# Patient Record
Sex: MALE | Race: OTHER | Employment: OTHER | URBAN - METROPOLITAN AREA
[De-identification: names, ages, dates, MRNs, and addresses within clinical notes are randomized per-mention and may not be internally consistent; named-entity substitution may affect disease eponyms.]

---

## 2020-01-08 RX ORDER — AZITHROMYCIN 250 MG/1
250 TABLET, FILM COATED ORAL DAILY
Status: ON HOLD | COMMUNITY
End: 2020-01-09

## 2020-01-08 RX ORDER — SIMVASTATIN 40 MG/1
80 TABLET, FILM COATED ORAL
COMMUNITY

## 2020-01-08 RX ORDER — DOXAZOSIN 8 MG/1
8 TABLET ORAL DAILY
COMMUNITY

## 2020-01-08 RX ORDER — BENZONATATE 100 MG/1
100 CAPSULE ORAL
Status: ON HOLD | COMMUNITY
End: 2021-05-07

## 2020-01-08 RX ORDER — HYDROCHLOROTHIAZIDE 25 MG/1
25 TABLET ORAL DAILY
Status: ON HOLD | COMMUNITY
End: 2021-05-07

## 2020-01-08 RX ORDER — ASPIRIN 81 MG/1
81 TABLET ORAL DAILY
COMMUNITY

## 2020-01-08 RX ORDER — ATENOLOL 100 MG/1
100 TABLET ORAL DAILY
Status: ON HOLD | COMMUNITY
End: 2021-05-07

## 2020-01-09 ENCOUNTER — HOSPITAL ENCOUNTER (OUTPATIENT)
Age: 85
Setting detail: OUTPATIENT SURGERY
Discharge: HOME | End: 2020-01-09
Attending: INTERNAL MEDICINE | Admitting: INTERNAL MEDICINE
Payer: MEDICARE

## 2020-01-09 VITALS
HEIGHT: 62 IN | OXYGEN SATURATION: 100 % | HEART RATE: 64 BPM | SYSTOLIC BLOOD PRESSURE: 150 MMHG | WEIGHT: 146.1 LBS | BODY MASS INDEX: 26.89 KG/M2 | DIASTOLIC BLOOD PRESSURE: 77 MMHG | TEMPERATURE: 97.6 F | RESPIRATION RATE: 14 BRPM

## 2020-01-09 DIAGNOSIS — I35.0 AORTIC STENOSIS: ICD-10-CM

## 2020-01-09 LAB
ACT BLD: 189 SECS (ref 79–138)
ANION GAP SERPL CALC-SCNC: 6 MMOL/L (ref 3–18)
ATRIAL RATE: 84 BPM
BUN SERPL-MCNC: 11 MG/DL (ref 7–18)
BUN/CREAT SERPL: 9 (ref 12–20)
CALCIUM SERPL-MCNC: 8.9 MG/DL (ref 8.5–10.1)
CALCULATED R AXIS, ECG10: -31 DEGREES
CALCULATED T AXIS, ECG11: -12 DEGREES
CHLORIDE SERPL-SCNC: 106 MMOL/L (ref 100–111)
CHOLEST SERPL-MCNC: 128 MG/DL
CO2 SERPL-SCNC: 30 MMOL/L (ref 21–32)
CREAT SERPL-MCNC: 1.17 MG/DL (ref 0.6–1.3)
DIAGNOSIS, 93000: NORMAL
ERYTHROCYTE [DISTWIDTH] IN BLOOD BY AUTOMATED COUNT: 12.9 % (ref 11.6–14.5)
GLUCOSE SERPL-MCNC: 108 MG/DL (ref 74–99)
HCT VFR BLD AUTO: 37.7 % (ref 36–48)
HDLC SERPL-MCNC: 45 MG/DL (ref 40–60)
HDLC SERPL: 2.8 {RATIO} (ref 0–5)
HGB BLD-MCNC: 13 G/DL (ref 13–16)
INR PPP: 1.1 (ref 0.8–1.2)
LDLC SERPL CALC-MCNC: 59.6 MG/DL (ref 0–100)
LIPID PROFILE,FLP: NORMAL
MAGNESIUM SERPL-MCNC: 2.3 MG/DL (ref 1.6–2.6)
MCH RBC QN AUTO: 29.9 PG (ref 24–34)
MCHC RBC AUTO-ENTMCNC: 34.5 G/DL (ref 31–37)
MCV RBC AUTO: 86.7 FL (ref 74–97)
P-R INTERVAL, ECG05: 232 MS
PLATELET # BLD AUTO: 131 K/UL (ref 135–420)
PMV BLD AUTO: 10.9 FL (ref 9.2–11.8)
POTASSIUM SERPL-SCNC: 3.4 MMOL/L (ref 3.5–5.5)
PROTHROMBIN TIME: 13.8 SEC (ref 11.5–15.2)
Q-T INTERVAL, ECG07: 402 MS
QRS DURATION, ECG06: 120 MS
QTC CALCULATION (BEZET), ECG08: 414 MS
RBC # BLD AUTO: 4.35 M/UL (ref 4.7–5.5)
SODIUM SERPL-SCNC: 142 MMOL/L (ref 136–145)
TRIGL SERPL-MCNC: 117 MG/DL (ref ?–150)
VENTRICULAR RATE, ECG03: 64 BPM
VLDLC SERPL CALC-MCNC: 23.4 MG/DL
WBC # BLD AUTO: 6.8 K/UL (ref 4.6–13.2)

## 2020-01-09 PROCEDURE — 74011636320 HC RX REV CODE- 636/320: Performed by: INTERNAL MEDICINE

## 2020-01-09 PROCEDURE — 83735 ASSAY OF MAGNESIUM: CPT

## 2020-01-09 PROCEDURE — 74011250637 HC RX REV CODE- 250/637: Performed by: INTERNAL MEDICINE

## 2020-01-09 PROCEDURE — 93005 ELECTROCARDIOGRAM TRACING: CPT

## 2020-01-09 PROCEDURE — 85610 PROTHROMBIN TIME: CPT

## 2020-01-09 PROCEDURE — 93460 R&L HRT ART/VENTRICLE ANGIO: CPT | Performed by: INTERNAL MEDICINE

## 2020-01-09 PROCEDURE — C1769 GUIDE WIRE: HCPCS | Performed by: INTERNAL MEDICINE

## 2020-01-09 PROCEDURE — C1887 CATHETER, GUIDING: HCPCS | Performed by: INTERNAL MEDICINE

## 2020-01-09 PROCEDURE — 99152 MOD SED SAME PHYS/QHP 5/>YRS: CPT | Performed by: INTERNAL MEDICINE

## 2020-01-09 PROCEDURE — 99153 MOD SED SAME PHYS/QHP EA: CPT | Performed by: INTERNAL MEDICINE

## 2020-01-09 PROCEDURE — 85347 COAGULATION TIME ACTIVATED: CPT

## 2020-01-09 PROCEDURE — 77030012468 HC VLV BLEEDBK CNTRL ABBT -B: Performed by: INTERNAL MEDICINE

## 2020-01-09 PROCEDURE — 77030013797 HC KT TRNSDUC PRSSR EDWD -A: Performed by: INTERNAL MEDICINE

## 2020-01-09 PROCEDURE — 77030004522 HC CATH ANGI DX EXPO BSC -A: Performed by: INTERNAL MEDICINE

## 2020-01-09 PROCEDURE — 77030004521 HC CATH ANGI DX COOK -B: Performed by: INTERNAL MEDICINE

## 2020-01-09 PROCEDURE — C1894 INTRO/SHEATH, NON-LASER: HCPCS | Performed by: INTERNAL MEDICINE

## 2020-01-09 PROCEDURE — C1751 CATH, INF, PER/CENT/MIDLINE: HCPCS | Performed by: INTERNAL MEDICINE

## 2020-01-09 PROCEDURE — 74011000250 HC RX REV CODE- 250: Performed by: INTERNAL MEDICINE

## 2020-01-09 PROCEDURE — 77030008543 HC TBNG MON PRSS MRTM -A: Performed by: INTERNAL MEDICINE

## 2020-01-09 PROCEDURE — 74011250636 HC RX REV CODE- 250/636: Performed by: INTERNAL MEDICINE

## 2020-01-09 PROCEDURE — 80061 LIPID PANEL: CPT

## 2020-01-09 PROCEDURE — 77030016699 HC CATH ANGI DX INFN1 CARD -A: Performed by: INTERNAL MEDICINE

## 2020-01-09 PROCEDURE — 77030029997 HC DEV COM RDL R BND TELE -B: Performed by: INTERNAL MEDICINE

## 2020-01-09 PROCEDURE — 80048 BASIC METABOLIC PNL TOTAL CA: CPT

## 2020-01-09 PROCEDURE — 85027 COMPLETE CBC AUTOMATED: CPT

## 2020-01-09 RX ORDER — SODIUM CHLORIDE 0.9 % (FLUSH) 0.9 %
5-40 SYRINGE (ML) INJECTION AS NEEDED
Status: DISCONTINUED | OUTPATIENT
Start: 2020-01-09 | End: 2020-01-09 | Stop reason: HOSPADM

## 2020-01-09 RX ORDER — GUAIFENESIN 100 MG/5ML
81 LIQUID (ML) ORAL ONCE
Status: COMPLETED | OUTPATIENT
Start: 2020-01-09 | End: 2020-01-09

## 2020-01-09 RX ORDER — VERAPAMIL HYDROCHLORIDE 2.5 MG/ML
INJECTION, SOLUTION INTRAVENOUS AS NEEDED
Status: DISCONTINUED | OUTPATIENT
Start: 2020-01-09 | End: 2020-01-09 | Stop reason: HOSPADM

## 2020-01-09 RX ORDER — SODIUM CHLORIDE 9 MG/ML
25 INJECTION, SOLUTION INTRAVENOUS CONTINUOUS
Status: DISCONTINUED | OUTPATIENT
Start: 2020-01-09 | End: 2020-01-09 | Stop reason: HOSPADM

## 2020-01-09 RX ORDER — LIDOCAINE HYDROCHLORIDE 10 MG/ML
INJECTION INFILTRATION; PERINEURAL AS NEEDED
Status: DISCONTINUED | OUTPATIENT
Start: 2020-01-09 | End: 2020-01-09 | Stop reason: HOSPADM

## 2020-01-09 RX ORDER — HYDRALAZINE HYDROCHLORIDE 20 MG/ML
INJECTION INTRAMUSCULAR; INTRAVENOUS AS NEEDED
Status: DISCONTINUED | OUTPATIENT
Start: 2020-01-09 | End: 2020-01-09 | Stop reason: HOSPADM

## 2020-01-09 RX ORDER — SODIUM CHLORIDE 0.9 % (FLUSH) 0.9 %
5-40 SYRINGE (ML) INJECTION EVERY 8 HOURS
Status: DISCONTINUED | OUTPATIENT
Start: 2020-01-09 | End: 2020-01-09 | Stop reason: HOSPADM

## 2020-01-09 RX ORDER — HEPARIN SODIUM 200 [USP'U]/100ML
INJECTION, SOLUTION INTRAVENOUS
Status: COMPLETED | OUTPATIENT
Start: 2020-01-09 | End: 2020-01-09

## 2020-01-09 RX ORDER — HEPARIN SODIUM 1000 [USP'U]/ML
INJECTION, SOLUTION INTRAVENOUS; SUBCUTANEOUS AS NEEDED
Status: DISCONTINUED | OUTPATIENT
Start: 2020-01-09 | End: 2020-01-09 | Stop reason: HOSPADM

## 2020-01-09 RX ORDER — MIDAZOLAM HYDROCHLORIDE 1 MG/ML
INJECTION, SOLUTION INTRAMUSCULAR; INTRAVENOUS AS NEEDED
Status: DISCONTINUED | OUTPATIENT
Start: 2020-01-09 | End: 2020-01-09 | Stop reason: HOSPADM

## 2020-01-09 RX ORDER — FENTANYL CITRATE 50 UG/ML
INJECTION, SOLUTION INTRAMUSCULAR; INTRAVENOUS AS NEEDED
Status: DISCONTINUED | OUTPATIENT
Start: 2020-01-09 | End: 2020-01-09 | Stop reason: HOSPADM

## 2020-01-09 RX ADMIN — SODIUM CHLORIDE 25 ML/HR: 900 INJECTION, SOLUTION INTRAVENOUS at 08:20

## 2020-01-09 RX ADMIN — ASPIRIN 81 MG: 81 TABLET, CHEWABLE ORAL at 08:00

## 2020-01-09 NOTE — PROGRESS NOTES
Cardiology Progress Note        Patient: Herber Justin        Sex: male          DOA: 1/9/2020  YOB: 1935      Age:  81 y.o.        LOS:  LOS: 0 days   Assessment/Plan   Date of Surgery Update:  Herber Justin was seen and examined. History and physical has been reviewed. The patient has been examined.  There have been no significant clinical changes since the completion of the originally dated History and Physical.    Signed By: Venkatesh Mullins MD     January 9, 2020 8:37 AM           Signed By: Venkatesh Mullins MD     January 9, 2020

## 2020-01-09 NOTE — Clinical Note
TRANSFER - IN REPORT:     Verbal report received from: rn.     Report consisted of patient's Situation, Background, Assessment and   Recommendations(SBAR). Opportunity for questions and clarification was provided. Assessment completed upon patient's arrival to unit and care assumed. Patient transported with a Registered Nurse and [address] [place] [address] / [facility] [place].

## 2020-01-09 NOTE — Clinical Note
TRANSFER - OUT REPORT:     Verbal report given to: rn.     Report consisted of patient's Situation, Background, Assessment and   Recommendations(SBAR). Opportunity for questions and clarification was provided. Patient transported with a Registered Nurse and [address] [place] [address] / [facility]. Patient transported to: care unit.

## 2020-01-09 NOTE — PROGRESS NOTES
Prepped & ready for procedure. Attempted x 2 for [facility] IV for RHC not successful by Dany Mancuso RN.   0272 back from cath. TR band intact to left radial no bleeding or swelling. Right femoral venous & arterial sheaths intact. Good n/v checks to right leg. Pt denies pain. Wife at bedside. [address] at bedside pulling both sheaths out and holding pressure. 1335 TR band released, sterile 2x2 & tegaderm applied with armboard. No bleeding or swelling. 1415 Scant amt of blood on right femoral dressing, area soft not hard, will continue to monitor. 1430 no further drainage on right femoral dressing. Voided 200 ml.  1545 Dr. Oskar Powers in, CD of cath report given wife. [phone] HOB elevated gradually, tolerated well. Right groin dressing intact, no bleeding or swelling. Discharge instructions reivewed with wife & pt. verbalized  understanding. 200 Ambulated with assist to bathroom, then ambulated around nurses station without any difficulty. IV removed. Right groin dressing intact & dry right wrist, dressing intact no bleeding or swelling at either site. 1815 Discharged home in care of wife in stable condition via w/c. Denies pain.

## 2020-01-09 NOTE — ROUTINE PROCESS
Cardiac Cath Lab:  Pre Procedure Chart Check     Patients chart was accessed and reviewed for possible and/or scheduled procedure. Creatinine Clearance:  Serum creatinine: 1.17 mg/dL 01/09/20 0805  Estimated creatinine clearance: 40.1 mL/min    Total Contrast  Load:  3 x estimated clearance amount=  120.3ml    75% of Contrast Load:  0.75 x Total Contrast Load=    90.2ml    Recent Labs     01/09/20  0805   WBC 6.8   RBC 4.35*   HCT 37.7   HGB 13.0   *   INR 1.1   PTP 13.8      K 3.4*   BUN 11   CREA 1.17   GFRAA >60   GFRNA 60*   CA 8.9       BMI: Body mass index is 26.72 kg/m². ALLERGIES:   Allergies   Allergen Reactions    Pcn [Penicillins] Rash       Lines:        Peripheral IV 01/09/20 Left Forearm (Active)   Site Assessment Clean, dry, & intact 1/9/2020  8:16 AM   Phlebitis Assessment 0 1/9/2020  8:16 AM   Infiltration Assessment 0 1/9/2020  8:16 AM   Dressing Status Clean, dry, & intact 1/9/2020  8:16 AM   Dressing Type Transparent;Tape 1/9/2020  8:16 AM   Hub Color/Line Status Pink 1/9/2020  8:16 AM   Action Taken Open ports on tubing capped 1/9/2020  8:16 AM   Alcohol Cap Used Yes 1/9/2020  8:16 AM          History:    Past Medical History:   Diagnosis Date    Arthritis     GERD (gastroesophageal reflux disease)     Hypertension      Past Surgical History:   Procedure Laterality Date    ABDOMEN SURGERY PROC UNLISTED      appendectomy    HX CHOLECYSTECTOMY      HX HEENT      blind left eye     HX UROLOGICAL      kiney stone    HX VASECTOMY       There is no problem list on file for this patient.

## 2020-01-09 NOTE — Clinical Note
Bilateral groin and right radial clipped, prepped with ChloraPrep and draped. Wet prep elapsed drying time: 5 mins.

## 2020-01-09 NOTE — DISCHARGE INSTRUCTIONS
DISCHARGE SUMMARY from Nurse    PATIENT INSTRUCTIONS:    After general anesthesia or intravenous sedation, for 24 hours or while taking prescription Narcotics:  · Limit your activities  · Do not drive and operate hazardous machinery  · Do not make important personal or business decisions  · Do  not drink alcoholic beverages  · If you have not urinated within 8 hours after discharge, please contact your surgeon on call. Report the following to your surgeon:  · Excessive pain, swelling, redness or odor of or around the surgical area  · Temperature over 100.5  · Nausea and vomiting lasting longer than 4 hours or if unable to take medications  · Any signs of decreased circulation or nerve impairment to extremity: change in color, persistent  numbness, tingling, coldness or increase pain  · Any questions    What to do at Home:    If you experience any of the following symptoms , please follow up with Dr. Myron Gordillo  *  Please give a list of your current medications to your Primary Care Provider. *  Please update this list whenever your medications are discontinued, doses are      changed, or new medications (including over-the-counter products) are added. *  Please carry medication information at all times in case of emergency situations. These are general instructions for a healthy lifestyle:    No smoking/ No tobacco products/ Avoid exposure to second hand smoke  Surgeon General's Warning:  Quitting smoking now greatly reduces serious risk to your health.     Obesity, smoking, and sedentary lifestyle greatly increases your risk for illness    A healthy diet, regular physical exercise & weight monitoring are important for maintaining a healthy lifestyle    You may be retaining fluid if you have a history of heart failure or if you experience any of the following symptoms:  Weight gain of 3 pounds or more overnight or 5 pounds in a week, increased swelling in our hands or feet or shortness of breath while lying flat in bed. Please call your doctor as soon as you notice any of these symptoms; do not wait until your next office visit. The discharge information has been reviewed with the patient and spouse. The patient and spouse verbalized understanding. Discharge medications reviewed with the patient and spouse and appropriate educational materials and side effects teaching were provided. ___________________________________________________________________________________________________________________________________                 Cardiac Catheterization/Angiography Discharge Instructions    *Check the puncture site frequently for swelling or bleeding. If you see any bleeding, lie down and apply pressure over the area with a clean towel or washcloth. Notify your doctor for any redness, swelling, drainage or oozing from the puncture site. Notify your doctor for any fever or chills. *If the leg or arm with the puncture becomes cold, numb or painful, call Dr Nitin Quezada    *Activity should be limited for the next 48 hours. Climb stairs as little as possible and avoid any stooping, bending or strenuous activity for 48 hours. No heavy lifting (anything over 10 pounds) for three days. *Do not drive for 48 hours. *You may resume your usual diet. Drink more fluids than usual.    *Have a responsible person drive you home and stay with you for at least 24 hours after your heart catheterization/angiography. *You may remove the bandage from your Right, Groin and Arm in 24 hours. You may shower in 24 hours. No tub baths, hot tubs or swimming for one week. Do not place any lotions, creams, powders, ointments over the puncture site for one week. You may place a clean band-aid over the puncture site each day for 5 days. Change this daily.       Patient armband removed and shredded

## 2020-01-10 LAB
CRD SYSTOLIC BP: 199
END DIASTOLIC PRESSURE: 21

## 2020-07-30 ENCOUNTER — HOSPITAL ENCOUNTER (EMERGENCY)
Dept: CT IMAGING | Age: 85
Discharge: HOME | End: 2020-07-30
Attending: EMERGENCY MEDICINE
Payer: MEDICARE

## 2020-07-30 ENCOUNTER — APPOINTMENT (OUTPATIENT)
Dept: GENERAL RADIOLOGY | Age: 85
End: 2020-07-30
Attending: EMERGENCY MEDICINE
Payer: MEDICARE

## 2020-07-30 ENCOUNTER — HOSPITAL ENCOUNTER (EMERGENCY)
Age: 85
Discharge: OTHER INSTITUTION | End: 2020-07-30
Attending: EMERGENCY MEDICINE
Payer: MEDICARE

## 2020-07-30 VITALS
DIASTOLIC BLOOD PRESSURE: 116 MMHG | TEMPERATURE: 98.4 F | SYSTOLIC BLOOD PRESSURE: 218 MMHG | HEIGHT: 62 IN | HEART RATE: 78 BPM | RESPIRATION RATE: 16 BRPM | WEIGHT: 143 LBS | BODY MASS INDEX: 26.31 KG/M2 | OXYGEN SATURATION: 100 %

## 2020-07-30 DIAGNOSIS — S06.5XAA SUBDURAL HEMATOMA: Primary | ICD-10-CM

## 2020-07-30 DIAGNOSIS — Z79.82 ON ASPIRIN AT HOME: ICD-10-CM

## 2020-07-30 DIAGNOSIS — S62.115A CLOSED NONDISPLACED FRACTURE OF TRIQUETRUM OF LEFT WRIST, INITIAL ENCOUNTER: ICD-10-CM
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
## 2020-07-30 LAB
ALBUMIN SERPL-MCNC: 4.6 G/DL (ref 3.4–5)
ALBUMIN/GLOB SERPL: 1.3 {RATIO} (ref 0.8–1.7)
ALP SERPL-CCNC: 50 U/L (ref 45–117)
ALT SERPL-CCNC: 81 U/L (ref 16–61)
ANION GAP SERPL CALC-SCNC: 4 MMOL/L (ref 3–18)
APTT PPP: 32.3 SEC (ref 23–36.4)
AST SERPL-CCNC: 50 U/L (ref 10–38)
BASOPHILS # BLD: 0 K/UL (ref 0–0.1)
BASOPHILS NFR BLD: 0 % (ref 0–2)
BILIRUB SERPL-MCNC: 0.6 MG/DL (ref 0.2–1)
BUN SERPL-MCNC: 20 MG/DL (ref 7–18)
BUN/CREAT SERPL: 16 (ref 12–20)
CALCIUM SERPL-MCNC: 9.1 MG/DL (ref 8.5–10.1)
CHLORIDE SERPL-SCNC: 105 MMOL/L (ref 100–111)
CO2 SERPL-SCNC: 31 MMOL/L (ref 21–32)
CREAT SERPL-MCNC: 1.23 MG/DL (ref 0.6–1.3)
DIFFERENTIAL METHOD BLD: ABNORMAL
EOSINOPHIL # BLD: 0.1 K/UL (ref 0–0.4)
EOSINOPHIL NFR BLD: 1 % (ref 0–5)
ERYTHROCYTE [DISTWIDTH] IN BLOOD BY AUTOMATED COUNT: 12.8 % (ref 11.6–14.5)
GLOBULIN SER CALC-MCNC: 3.5 G/DL (ref 2–4)
GLUCOSE SERPL-MCNC: 79 MG/DL (ref 74–99)
HCT VFR BLD AUTO: 45.7 % (ref 36–48)
HGB BLD-MCNC: 15.2 G/DL (ref 13–16)
INR PPP: 1 (ref 0.8–1.2)
LYMPHOCYTES # BLD: 1.9 K/UL (ref 0.9–3.6)
LYMPHOCYTES NFR BLD: 17 % (ref 21–52)
MCH RBC QN AUTO: 29.5 PG (ref 24–34)
MCHC RBC AUTO-ENTMCNC: 33.3 G/DL (ref 31–37)
MCV RBC AUTO: 88.7 FL (ref 74–97)
MONOCYTES # BLD: 0.8 K/UL (ref 0.05–1.2)
MONOCYTES NFR BLD: 7 % (ref 3–10)
NEUTS SEG # BLD: 8.3 K/UL (ref 1.8–8)
NEUTS SEG NFR BLD: 75 % (ref 40–73)
PLATELET # BLD AUTO: 126 K/UL (ref 135–420)
PMV BLD AUTO: 11.3 FL (ref 9.2–11.8)
POTASSIUM SERPL-SCNC: 4.5 MMOL/L (ref 3.5–5.5)
PROT SERPL-MCNC: 8.1 G/DL (ref 6.4–8.2)
PROTHROMBIN TIME: 13.4 SEC (ref 11.5–15.2)
RBC # BLD AUTO: 5.15 M/UL (ref 4.7–5.5)
SODIUM SERPL-SCNC: 140 MMOL/L (ref 136–145)
WBC # BLD AUTO: 11 K/UL (ref 4.6–13.2)

## 2020-07-30 PROCEDURE — 85025 COMPLETE CBC W/AUTO DIFF WBC: CPT

## 2020-07-30 PROCEDURE — 99284 EMERGENCY DEPT VISIT MOD MDM: CPT

## 2020-07-30 PROCEDURE — 75810000053 HC SPLINT APPLICATION

## 2020-07-30 PROCEDURE — 70450 CT HEAD/BRAIN W/O DYE: CPT

## 2020-07-30 PROCEDURE — 85730 THROMBOPLASTIN TIME PARTIAL: CPT

## 2020-07-30 PROCEDURE — 74011250636 HC RX REV CODE- 250/636: Performed by: EMERGENCY MEDICINE

## 2020-07-30 PROCEDURE — 80053 COMPREHEN METABOLIC PANEL: CPT

## 2020-07-30 PROCEDURE — 96368 THER/DIAG CONCURRENT INF: CPT

## 2020-07-30 PROCEDURE — 96365 THER/PROPH/DIAG IV INF INIT: CPT

## 2020-07-30 PROCEDURE — 73130 X-RAY EXAM OF HAND: CPT

## 2020-07-30 PROCEDURE — 85610 PROTHROMBIN TIME: CPT

## 2020-07-30 PROCEDURE — 72131 CT LUMBAR SPINE W/O DYE: CPT

## 2020-07-30 PROCEDURE — 74011000250 HC RX REV CODE- 250: Performed by: EMERGENCY MEDICINE

## 2020-07-30 RX ORDER — LEVETIRACETAM 10 MG/ML
1000 INJECTION INTRAVASCULAR
Status: COMPLETED | OUTPATIENT
Start: 2020-07-30 | End: 2020-07-30

## 2020-07-30 RX ORDER — NICARDIPINE HYDROCHLORIDE 0.1 MG/ML
5-15 INJECTION INTRAVENOUS
Status: DISCONTINUED | OUTPATIENT
Start: 2020-07-30 | End: 2020-07-30 | Stop reason: HOSPADM

## 2020-07-30 RX ORDER — LABETALOL HCL 20 MG/4 ML
20 SYRINGE (ML) INTRAVENOUS
Status: DISCONTINUED | OUTPATIENT
Start: 2020-07-30 | End: 2020-07-30

## 2020-07-30 RX ADMIN — LEVETIRACETAM 1000 MG: 10 INJECTION INTRAVENOUS at 14:37

## 2021-05-05 ENCOUNTER — HOSPITAL ENCOUNTER (INPATIENT)
Age: 86
LOS: 3 days | Discharge: HOME | DRG: 392 | End: 2021-05-08
Attending: STUDENT IN AN ORGANIZED HEALTH CARE EDUCATION/TRAINING PROGRAM | Admitting: FAMILY MEDICINE
Payer: MEDICARE

## 2021-05-05 ENCOUNTER — APPOINTMENT (OUTPATIENT)
Dept: GENERAL RADIOLOGY | Age: 86
DRG: 392 | End: 2021-05-05
Attending: STUDENT IN AN ORGANIZED HEALTH CARE EDUCATION/TRAINING PROGRAM
Payer: MEDICARE

## 2021-05-05 ENCOUNTER — APPOINTMENT (OUTPATIENT)
Dept: CT IMAGING | Age: 86
DRG: 392 | End: 2021-05-05
Attending: STUDENT IN AN ORGANIZED HEALTH CARE EDUCATION/TRAINING PROGRAM
Payer: MEDICARE

## 2021-05-05 DIAGNOSIS — R65.10 SIRS (SYSTEMIC INFLAMMATORY RESPONSE SYNDROME) (HCC): ICD-10-CM

## 2021-05-05 DIAGNOSIS — K52.9 COLITIS: Primary | ICD-10-CM

## 2021-05-05 DIAGNOSIS — R93.5 ABNORMAL CT OF THE ABDOMEN: ICD-10-CM

## 2021-05-05 LAB
ALBUMIN SERPL-MCNC: 3.7 G/DL (ref 3.4–5)
ALBUMIN/GLOB SERPL: 1.3 {RATIO} (ref 0.8–1.7)
ALP SERPL-CCNC: 40 U/L (ref 45–117)
ALT SERPL-CCNC: 32 U/L (ref 16–61)
ANION GAP SERPL CALC-SCNC: 8 MMOL/L (ref 3–18)
APPEARANCE UR: CLEAR
AST SERPL-CCNC: 18 U/L (ref 10–38)
ATRIAL RATE: 77 BPM
BACTERIA URNS QL MICRO: ABNORMAL /HPF
BASOPHILS # BLD: 0 K/UL (ref 0–0.1)
BASOPHILS NFR BLD: 0 % (ref 0–2)
BILIRUB SERPL-MCNC: 0.7 MG/DL (ref 0.2–1)
BILIRUB UR QL: ABNORMAL
BUN SERPL-MCNC: 23 MG/DL (ref 7–18)
BUN/CREAT SERPL: 17 (ref 12–20)
CALCIUM SERPL-MCNC: 8.6 MG/DL (ref 8.5–10.1)
CALCULATED P AXIS, ECG09: 23 DEGREES
CALCULATED R AXIS, ECG10: -40 DEGREES
CALCULATED T AXIS, ECG11: 100 DEGREES
CHLORIDE SERPL-SCNC: 108 MMOL/L (ref 100–111)
CK MB CFR SERPL CALC: 1.4 % (ref 0–4)
CK MB SERPL-MCNC: 1.9 NG/ML (ref 5–25)
CK SERPL-CCNC: 136 U/L (ref 39–308)
CO2 SERPL-SCNC: 24 MMOL/L (ref 21–32)
COLOR UR: ABNORMAL
COVID-19 RAPID TEST, COVR: NOT DETECTED
CREAT SERPL-MCNC: 1.39 MG/DL (ref 0.6–1.3)
DIAGNOSIS, 93000: NORMAL
DIFFERENTIAL METHOD BLD: ABNORMAL
EOSINOPHIL # BLD: 0 K/UL (ref 0–0.4)
EOSINOPHIL NFR BLD: 0 % (ref 0–5)
EPITH CASTS URNS QL MICRO: ABNORMAL /LPF (ref 0–5)
ERYTHROCYTE [DISTWIDTH] IN BLOOD BY AUTOMATED COUNT: 12.9 % (ref 11.6–14.5)
GLOBULIN SER CALC-MCNC: 2.8 G/DL (ref 2–4)
GLUCOSE SERPL-MCNC: 98 MG/DL (ref 74–99)
GLUCOSE UR STRIP.AUTO-MCNC: 100 MG/DL
HCT VFR BLD AUTO: 39.4 % (ref 36–48)
HGB BLD-MCNC: 13.5 G/DL (ref 13–16)
HGB UR QL STRIP: NEGATIVE
HYALINE CASTS URNS QL MICRO: ABNORMAL /LPF (ref 0–2)
KETONES UR QL STRIP.AUTO: ABNORMAL MG/DL
LACTATE BLD-SCNC: 0.74 MMOL/L (ref 0.4–2)
LEUKOCYTE ESTERASE UR QL STRIP.AUTO: NEGATIVE
LIPASE SERPL-CCNC: 72 U/L (ref 73–393)
LYMPHOCYTES # BLD: 2.5 K/UL (ref 0.9–3.6)
LYMPHOCYTES NFR BLD: 15 % (ref 21–52)
MCH RBC QN AUTO: 30.9 PG (ref 24–34)
MCHC RBC AUTO-ENTMCNC: 34.3 G/DL (ref 31–37)
MCV RBC AUTO: 90.2 FL (ref 74–97)
MONOCYTES # BLD: 1.5 K/UL (ref 0.05–1.2)
MONOCYTES NFR BLD: 9 % (ref 3–10)
MUCOUS THREADS URNS QL MICRO: ABNORMAL /LPF
NEUTS SEG # BLD: 12.8 K/UL (ref 1.8–8)
NEUTS SEG NFR BLD: 76 % (ref 40–73)
NITRITE UR QL STRIP.AUTO: NEGATIVE
P-R INTERVAL, ECG05: 216 MS
PH UR STRIP: 5.5 [PH] (ref 5–8)
PLATELET # BLD AUTO: 147 K/UL (ref 135–420)
PMV BLD AUTO: 11.4 FL (ref 9.2–11.8)
POTASSIUM SERPL-SCNC: 3.9 MMOL/L (ref 3.5–5.5)
PROT SERPL-MCNC: 6.5 G/DL (ref 6.4–8.2)
PROT UR STRIP-MCNC: 30 MG/DL
Q-T INTERVAL, ECG07: 362 MS
QRS DURATION, ECG06: 114 MS
QTC CALCULATION (BEZET), ECG08: 409 MS
RBC # BLD AUTO: 4.37 M/UL (ref 4.35–5.65)
RBC #/AREA URNS HPF: ABNORMAL /HPF (ref 0–5)
SARS-COV-2, COV2: NORMAL
SODIUM SERPL-SCNC: 140 MMOL/L (ref 136–145)
SOURCE, COVRS: NORMAL
SP GR UR REFRACTOMETRY: >1.03 (ref 1–1.03)
TROPONIN I SERPL-MCNC: <0.02 NG/ML (ref 0–0.04)
UROBILINOGEN UR QL STRIP.AUTO: 1 EU/DL (ref 0.2–1)
VENTRICULAR RATE, ECG03: 77 BPM
WBC # BLD AUTO: 16.9 K/UL (ref 4.6–13.2)
WBC URNS QL MICRO: ABNORMAL /HPF (ref 0–5)

## 2021-05-05 PROCEDURE — U0003 INFECTIOUS AGENT DETECTION BY NUCLEIC ACID (DNA OR RNA); SEVERE ACUTE RESPIRATORY SYNDROME CORONAVIRUS 2 (SARS-COV-2) (CORONAVIRUS DISEASE [COVID-19]), AMPLIFIED PROBE TECHNIQUE, MAKING USE OF HIGH THROUGHPUT TECHNOLOGIES AS DESCRIBED BY CMS-2020-01-R: HCPCS

## 2021-05-05 PROCEDURE — 80053 COMPREHEN METABOLIC PANEL: CPT

## 2021-05-05 PROCEDURE — 65270000029 HC RM PRIVATE

## 2021-05-05 PROCEDURE — 93005 ELECTROCARDIOGRAM TRACING: CPT

## 2021-05-05 PROCEDURE — 83605 ASSAY OF LACTIC ACID: CPT

## 2021-05-05 PROCEDURE — 74011250637 HC RX REV CODE- 250/637: Performed by: PHYSICIAN ASSISTANT

## 2021-05-05 PROCEDURE — 96374 THER/PROPH/DIAG INJ IV PUSH: CPT

## 2021-05-05 PROCEDURE — 71045 X-RAY EXAM CHEST 1 VIEW: CPT

## 2021-05-05 PROCEDURE — 82553 CREATINE MB FRACTION: CPT

## 2021-05-05 PROCEDURE — 81001 URINALYSIS AUTO W/SCOPE: CPT

## 2021-05-05 PROCEDURE — 99285 EMERGENCY DEPT VISIT HI MDM: CPT

## 2021-05-05 PROCEDURE — 87635 SARS-COV-2 COVID-19 AMP PRB: CPT

## 2021-05-05 PROCEDURE — 74011250636 HC RX REV CODE- 250/636: Performed by: PHYSICIAN ASSISTANT

## 2021-05-05 PROCEDURE — 85025 COMPLETE CBC W/AUTO DIFF WBC: CPT

## 2021-05-05 PROCEDURE — 74177 CT ABD & PELVIS W/CONTRAST: CPT

## 2021-05-05 PROCEDURE — 87040 BLOOD CULTURE FOR BACTERIA: CPT

## 2021-05-05 PROCEDURE — 83690 ASSAY OF LIPASE: CPT

## 2021-05-05 PROCEDURE — 74011000636 HC RX REV CODE- 636: Performed by: STUDENT IN AN ORGANIZED HEALTH CARE EDUCATION/TRAINING PROGRAM

## 2021-05-05 RX ORDER — ACETAMINOPHEN 325 MG/1
650 TABLET ORAL
Status: DISCONTINUED | OUTPATIENT
Start: 2021-05-05 | End: 2021-05-05

## 2021-05-05 RX ORDER — CLOPIDOGREL BISULFATE 75 MG/1
75 TABLET ORAL DAILY
COMMUNITY

## 2021-05-05 RX ORDER — ENOXAPARIN SODIUM 100 MG/ML
40 INJECTION SUBCUTANEOUS DAILY
Status: DISCONTINUED | OUTPATIENT
Start: 2021-05-06 | End: 2021-05-08 | Stop reason: HOSPADM

## 2021-05-05 RX ORDER — METRONIDAZOLE 500 MG/100ML
500 INJECTION, SOLUTION INTRAVENOUS EVERY 8 HOURS
Status: DISCONTINUED | OUTPATIENT
Start: 2021-05-05 | End: 2021-05-08 | Stop reason: HOSPADM

## 2021-05-05 RX ORDER — POLYETHYLENE GLYCOL 3350 17 G/17G
17 POWDER, FOR SOLUTION ORAL DAILY PRN
Status: DISCONTINUED | OUTPATIENT
Start: 2021-05-05 | End: 2021-05-08 | Stop reason: HOSPADM

## 2021-05-05 RX ORDER — ONDANSETRON 2 MG/ML
4 INJECTION INTRAMUSCULAR; INTRAVENOUS
Status: DISCONTINUED | OUTPATIENT
Start: 2021-05-05 | End: 2021-05-08 | Stop reason: HOSPADM

## 2021-05-05 RX ORDER — ACETAMINOPHEN 500 MG
1000 TABLET ORAL
Status: COMPLETED | OUTPATIENT
Start: 2021-05-05 | End: 2021-05-05

## 2021-05-05 RX ORDER — PROMETHAZINE HYDROCHLORIDE 25 MG/1
12.5 TABLET ORAL
Status: DISCONTINUED | OUTPATIENT
Start: 2021-05-05 | End: 2021-05-08 | Stop reason: HOSPADM

## 2021-05-05 RX ORDER — ACETAMINOPHEN 325 MG/1
650 TABLET ORAL
Status: DISCONTINUED | OUTPATIENT
Start: 2021-05-05 | End: 2021-05-08 | Stop reason: HOSPADM

## 2021-05-05 RX ORDER — LEVOFLOXACIN 5 MG/ML
750 INJECTION, SOLUTION INTRAVENOUS EVERY 24 HOURS
Status: DISCONTINUED | OUTPATIENT
Start: 2021-05-05 | End: 2021-05-08

## 2021-05-05 RX ORDER — OXYCODONE HYDROCHLORIDE 5 MG/1
5 TABLET ORAL
Status: DISCONTINUED | OUTPATIENT
Start: 2021-05-05 | End: 2021-05-08 | Stop reason: HOSPADM

## 2021-05-05 RX ORDER — POTASSIUM CHLORIDE 750 MG/1
TABLET, FILM COATED, EXTENDED RELEASE ORAL
COMMUNITY

## 2021-05-05 RX ORDER — SODIUM CHLORIDE 0.9 % (FLUSH) 0.9 %
5-10 SYRINGE (ML) INJECTION AS NEEDED
Status: DISCONTINUED | OUTPATIENT
Start: 2021-05-05 | End: 2021-05-08 | Stop reason: SDUPTHER

## 2021-05-05 RX ORDER — SODIUM CHLORIDE 0.9 % (FLUSH) 0.9 %
5-40 SYRINGE (ML) INJECTION AS NEEDED
Status: DISCONTINUED | OUTPATIENT
Start: 2021-05-05 | End: 2021-05-08 | Stop reason: HOSPADM

## 2021-05-05 RX ORDER — LANOLIN ALCOHOL/MO/W.PET/CERES
400 CREAM (GRAM) TOPICAL DAILY
COMMUNITY

## 2021-05-05 RX ORDER — ACETAMINOPHEN 650 MG/1
650 SUPPOSITORY RECTAL
Status: DISCONTINUED | OUTPATIENT
Start: 2021-05-05 | End: 2021-05-08 | Stop reason: HOSPADM

## 2021-05-05 RX ORDER — LISINOPRIL 10 MG/1
10 TABLET ORAL DAILY
Status: ON HOLD | COMMUNITY
End: 2021-05-07

## 2021-05-05 RX ORDER — SODIUM CHLORIDE 0.9 % (FLUSH) 0.9 %
5-40 SYRINGE (ML) INJECTION EVERY 8 HOURS
Status: DISCONTINUED | OUTPATIENT
Start: 2021-05-05 | End: 2021-05-08 | Stop reason: HOSPADM

## 2021-05-05 RX ORDER — MORPHINE SULFATE 2 MG/ML
1 INJECTION, SOLUTION INTRAMUSCULAR; INTRAVENOUS
Status: DISCONTINUED | OUTPATIENT
Start: 2021-05-05 | End: 2021-05-08 | Stop reason: HOSPADM

## 2021-05-05 RX ORDER — AMLODIPINE BESYLATE 5 MG/1
5 TABLET ORAL DAILY
COMMUNITY

## 2021-05-05 RX ORDER — METOPROLOL SUCCINATE 50 MG/1
50 TABLET, EXTENDED RELEASE ORAL 2 TIMES DAILY
COMMUNITY

## 2021-05-05 RX ADMIN — Medication 10 ML: at 23:56

## 2021-05-05 RX ADMIN — LEVOFLOXACIN 750 MG: 5 INJECTION, SOLUTION INTRAVENOUS at 21:00

## 2021-05-05 RX ADMIN — ACETAMINOPHEN 1000 MG: 500 TABLET ORAL at 17:09

## 2021-05-05 RX ADMIN — METRONIDAZOLE 500 MG: 500 INJECTION, SOLUTION INTRAVENOUS at 19:50

## 2021-05-05 RX ADMIN — IOPAMIDOL 100 ML: 612 INJECTION, SOLUTION INTRAVENOUS at 17:51

## 2021-05-05 NOTE — ED TRIAGE NOTES
Pt to ED for RUQ abdominal pain, fever, and generalized fatigue/weakness since Sunday. Pt denies N/V, but reports diarrhea since Tuesday night. Pt denies CP/SOB, but reports non-productive cough.

## 2021-05-05 NOTE — Clinical Note
Status[de-identified] INPATIENT [101]   Type of Bed: Medical [8]   Cardiac Monitoring Required?: No   Inpatient Hospitalization Certified Necessary for the Following Reasons: 3.  Patient receiving treatment that can only be provided in an inpatient setting (further clarification in H&P documentation)   Admitting Diagnosis: Colitis [326529]   Admitting Diagnosis: SIRS (systemic inflammatory response syndrome) [facility]) [7702069]   Admitting Physician: Angelito Phan [[id number]]   Attending Physician: Angelito Phan [[id number]]   Estimated Length of Stay: 2 Midnights   Discharge Plan[de-identified] Home with Office Follow-up

## 2021-05-05 NOTE — ED NOTES
Report received from [facility]. Assumed care of patient at this time.

## 2021-05-06 ENCOUNTER — APPOINTMENT (OUTPATIENT)
Dept: VASCULAR SURGERY | Age: 86
DRG: 392 | End: 2021-05-06
Attending: FAMILY MEDICINE
Payer: MEDICARE

## 2021-05-06 ENCOUNTER — APPOINTMENT (OUTPATIENT)
Dept: GENERAL RADIOLOGY | Age: 86
DRG: 392 | End: 2021-05-06
Attending: FAMILY MEDICINE
Payer: MEDICARE

## 2021-05-06 PROBLEM — I10 HTN (HYPERTENSION): Status: ACTIVE | Noted: 2021-05-06

## 2021-05-06 PROBLEM — N18.30 CKD (CHRONIC KIDNEY DISEASE) STAGE 3, GFR 30-59 ML/MIN (HCC): Status: ACTIVE | Noted: 2021-05-06

## 2021-05-06 PROBLEM — R05.9 COUGH: Status: ACTIVE | Noted: 2021-05-06

## 2021-05-06 LAB
ALBUMIN SERPL-MCNC: 3.2 G/DL (ref 3.4–5)
ALBUMIN/GLOB SERPL: 1.1 {RATIO} (ref 0.8–1.7)
ALP SERPL-CCNC: 40 U/L (ref 45–117)
ALT SERPL-CCNC: 27 U/L (ref 16–61)
ANION GAP SERPL CALC-SCNC: 6 MMOL/L (ref 3–18)
AST SERPL-CCNC: 16 U/L (ref 10–38)
BILIRUB SERPL-MCNC: 0.7 MG/DL (ref 0.2–1)
BUN SERPL-MCNC: 19 MG/DL (ref 7–18)
BUN/CREAT SERPL: 14 (ref 12–20)
CALCIUM SERPL-MCNC: 8.5 MG/DL (ref 8.5–10.1)
CHLORIDE SERPL-SCNC: 107 MMOL/L (ref 100–111)
CO2 SERPL-SCNC: 29 MMOL/L (ref 21–32)
CREAT SERPL-MCNC: 1.34 MG/DL (ref 0.6–1.3)
ERYTHROCYTE [DISTWIDTH] IN BLOOD BY AUTOMATED COUNT: 13.1 % (ref 11.6–14.5)
GLOBULIN SER CALC-MCNC: 2.8 G/DL (ref 2–4)
GLUCOSE SERPL-MCNC: 93 MG/DL (ref 74–99)
HCT VFR BLD AUTO: 38.2 % (ref 36–48)
HGB BLD-MCNC: 13 G/DL (ref 13–16)
MCH RBC QN AUTO: 31 PG (ref 24–34)
MCHC RBC AUTO-ENTMCNC: 34 G/DL (ref 31–37)
MCV RBC AUTO: 91.2 FL (ref 74–97)
PLATELET # BLD AUTO: 102 K/UL (ref 135–420)
PMV BLD AUTO: 11.6 FL (ref 9.2–11.8)
POTASSIUM SERPL-SCNC: 3.9 MMOL/L (ref 3.5–5.5)
PROT SERPL-MCNC: 6 G/DL (ref 6.4–8.2)
RBC # BLD AUTO: 4.19 M/UL (ref 4.35–5.65)
SARS-COV-2, COV2NT: NOT DETECTED
SODIUM SERPL-SCNC: 142 MMOL/L (ref 136–145)
WBC # BLD AUTO: 12.6 K/UL (ref 4.6–13.2)

## 2021-05-06 PROCEDURE — 65270000029 HC RM PRIVATE

## 2021-05-06 PROCEDURE — 92610 EVALUATE SWALLOWING FUNCTION: CPT

## 2021-05-06 PROCEDURE — 74011250636 HC RX REV CODE- 250/636: Performed by: PHYSICIAN ASSISTANT

## 2021-05-06 PROCEDURE — 74011250637 HC RX REV CODE- 250/637: Performed by: FAMILY MEDICINE

## 2021-05-06 PROCEDURE — 93971 EXTREMITY STUDY: CPT

## 2021-05-06 PROCEDURE — 74230 X-RAY XM SWLNG FUNCJ C+: CPT

## 2021-05-06 PROCEDURE — 92526 ORAL FUNCTION THERAPY: CPT

## 2021-05-06 PROCEDURE — 36415 COLL VENOUS BLD VENIPUNCTURE: CPT

## 2021-05-06 PROCEDURE — 85027 COMPLETE CBC AUTOMATED: CPT

## 2021-05-06 PROCEDURE — 80053 COMPREHEN METABOLIC PANEL: CPT

## 2021-05-06 PROCEDURE — 74011000250 HC RX REV CODE- 250: Performed by: FAMILY MEDICINE

## 2021-05-06 PROCEDURE — 92611 MOTION FLUOROSCOPY/SWALLOW: CPT

## 2021-05-06 PROCEDURE — 74011250636 HC RX REV CODE- 250/636: Performed by: FAMILY MEDICINE

## 2021-05-06 RX ORDER — ASPIRIN 81 MG/1
81 TABLET ORAL DAILY
Status: DISCONTINUED | OUTPATIENT
Start: 2021-05-06 | End: 2021-05-08 | Stop reason: HOSPADM

## 2021-05-06 RX ORDER — CLOPIDOGREL BISULFATE 75 MG/1
75 TABLET ORAL DAILY
Status: DISCONTINUED | OUTPATIENT
Start: 2021-05-06 | End: 2021-05-08 | Stop reason: HOSPADM

## 2021-05-06 RX ORDER — ATORVASTATIN CALCIUM 20 MG/1
20 TABLET, FILM COATED ORAL
Status: DISCONTINUED | OUTPATIENT
Start: 2021-05-06 | End: 2021-05-08 | Stop reason: HOSPADM

## 2021-05-06 RX ORDER — SODIUM CHLORIDE 9 MG/ML
75 INJECTION, SOLUTION INTRAVENOUS CONTINUOUS
Status: DISCONTINUED | OUTPATIENT
Start: 2021-05-06 | End: 2021-05-08 | Stop reason: HOSPADM

## 2021-05-06 RX ORDER — DOXAZOSIN 4 MG/1
8 TABLET ORAL DAILY
Status: DISCONTINUED | OUTPATIENT
Start: 2021-05-06 | End: 2021-05-08 | Stop reason: HOSPADM

## 2021-05-06 RX ADMIN — LEVOFLOXACIN 750 MG: 5 INJECTION, SOLUTION INTRAVENOUS at 20:06

## 2021-05-06 RX ADMIN — Medication 10 ML: at 21:39

## 2021-05-06 RX ADMIN — ENOXAPARIN SODIUM 40 MG: 40 INJECTION SUBCUTANEOUS at 12:25

## 2021-05-06 RX ADMIN — Medication 1 TABLET: at 10:00

## 2021-05-06 RX ADMIN — ATORVASTATIN CALCIUM 20 MG: 20 TABLET, FILM COATED ORAL at 21:39

## 2021-05-06 RX ADMIN — SODIUM CHLORIDE 75 ML/HR: 900 INJECTION, SOLUTION INTRAVENOUS at 03:37

## 2021-05-06 RX ADMIN — FAMOTIDINE 20 MG: 10 INJECTION INTRAVENOUS at 21:39

## 2021-05-06 RX ADMIN — BARIUM SULFATE 15 ML: 400 PASTE ORAL at 14:07

## 2021-05-06 RX ADMIN — BARIUM SULFATE 25 G: 960 POWDER, FOR SUSPENSION ORAL at 14:07

## 2021-05-06 RX ADMIN — Medication 81 MG: at 10:00

## 2021-05-06 RX ADMIN — METRONIDAZOLE 500 MG: 500 INJECTION, SOLUTION INTRAVENOUS at 20:09

## 2021-05-06 RX ADMIN — CLOPIDOGREL BISULFATE 75 MG: 75 TABLET ORAL at 10:00

## 2021-05-06 RX ADMIN — METRONIDAZOLE 500 MG: 500 INJECTION, SOLUTION INTRAVENOUS at 11:25

## 2021-05-06 RX ADMIN — METRONIDAZOLE 500 MG: 500 INJECTION, SOLUTION INTRAVENOUS at 03:37

## 2021-05-06 RX ADMIN — BARIUM SULFATE 700 MG: 700 TABLET ORAL at 14:08

## 2021-05-06 RX ADMIN — DOXAZOSIN 8 MG: 4 TABLET ORAL at 10:00

## 2021-05-06 RX ADMIN — FAMOTIDINE 20 MG: 10 INJECTION INTRAVENOUS at 12:25

## 2021-05-06 NOTE — PROGRESS NOTES
Hospitalist Progress Note    Patient: Дмитрий Sullivan MRN: [id number]  CSN: [id number]    YOB: 1935  Age: 81 y.o. Sex: male    DOA: 5/5/2021 LOS:  LOS: 1 day          Chief Complaint:    Abdominal pain    Assessment/Plan     56-year-old male with hypertension, prior stroke, and CKD stage III admitted with colitis and SIRS. Colitis with SIRS  Cough, possibly due to aspiration  Hypertension  CKD stage III    Appreciate surgery evaluation, recommend medical management and possibly GI consult if symptoms worsening, have signed off  Fever has resolved  More advance diet as tolerated  Continue fluid hydration  Follow cultures  Continue Levaquin and Flagyl    Having swallowing evaluation today -had 0 aspiration events across all studies trials, released to regular diet    Blood pressure continues to be controlled -we will add back at her hypertensives as needed    Follow BMP  Creatinine remains at baseline  Avoid nephrotoxins    Disposition :  Patient Active Problem List   Diagnosis Code    Colitis K52.9    SIRS (systemic inflammatory response syndrome) ([facility]) R65.10    HTN (hypertension) I10    CKD (chronic kidney disease) stage 3, GFR 30-59 ml/min ([facility]) N18.30    Cough R05       Subjective:    Patient feeling much better this morning. Abdominal pain much reduced. As she is hungry and wants more food. Reports episode of diarrhea this morning. This is first episode of diarrhea days has since has been inpatient. Did have diarrhea at home. Review of systems:    Constitutional: denies fevers, chills, myalgias  Respiratory: denies SOB, cough  Cardiovascular: denies chest pain, palpitations  Gastrointestinal: denies nausea, vomiting, diarrhea      Vital signs/Intake and Output:  Visit Vitals  /60   Pulse 75   Temp 98 °F (36.7 °C)   Resp 16   Ht 5' 2\" (1.575 m)   Wt 68 kg (150 lb)   SpO2 100%   BMI 27.44 kg/m²     Current Shift:  No intake/output data recorded.   Last three shifts: 05/04 1901 - 05/06 0700  In: 100 [I.V.:100]  Out: -     Exam:    General: Well developed, alert, NAD, OX3  Head/Neck: NCAT, supple, No masses, No lymphadenopathy  CVS:Regular rate and rhythm, no M/R/G, S1/S2 heard, no thrill  Lungs:Clear to auscultation bilaterally, no wheezes, rhonchi, or rales  Abdomen: Soft, Nontender, No distention, Normal Bowel sounds, No hepatomegaly  Extremities: No C/C/E, pulses palpable 2+  Skin:normal texture and turgor, no rashes, no lesions  Neuro:grossly normal , follows commands  Psych:appropriate                Labs: Results:       Chemistry Recent Labs     05/06/21  0330 05/05/21  1656   GLU 93 98    140   K 3.9 3.9    108   CO2 29 24   BUN 19* 23*   CREA 1.34* 1.39*   CA 8.5 8.6   AGAP 6 8   BUCR 14 17   AP 40* 40*   TP 6.0* 6.5   ALB 3.2* 3.7   GLOB 2.8 2.8   AGRAT 1.1 1.3      CBC w/Diff Recent Labs     05/06/21  0330 05/05/21  1656   WBC 12.6 16.9*   RBC 4.19* 4.37   HGB 13.0 13.5   HCT 38.2 39.4   * 147   GRANS  --  76*   LYMPH  --  15*   EOS  --  0      Cardiac Enzymes Recent Labs     05/05/21  1656      CKND1 1.4      Coagulation No results for input(s): PTP, INR, APTT, INREXT in the last 72 hours. Lipid Panel Lab Results   Component Value Date/Time    Cholesterol, total 128 01/09/2020 08:05 AM    HDL Cholesterol 45 01/09/2020 08:05 AM    LDL, calculated 59.6 01/09/2020 08:05 AM    VLDL, calculated 23.4 01/09/2020 08:05 AM    Triglyceride 117 01/09/2020 08:05 AM    CHOL/HDL Ratio 2.8 01/09/2020 08:05 AM      BNP No results for input(s): BNPP in the last 72 hours.    Liver Enzymes Recent Labs     05/06/21  0330   TP 6.0*   ALB 3.2*   AP 40*      Thyroid Studies No results found for: T4, T3U, TSH, TSHEXT     Procedures/imaging: see electronic medical records for all procedures/Xrays and details which were not copied into this note but were reviewed prior to creation of Haylie Regalado MD

## 2021-05-06 NOTE — PROGRESS NOTES
Problem: Dysphagia (Adult)  Goal: *Acute Goals and Plan of Care (Insert Text)  Description: Recommendations:  Diet: regular/thin  Meds: per pt preference  Aspiration Precautions  Oral Care TID      Goals:  Patient will:  1. Tolerate diet and/or diet upgrade without overt s/sx of aspiration under SLP supervision  2. Utilize compensatory swallow strategies of small bite/sip, alternate liquid/solid with min cues   3. Perform oral-motor and laryngeal elevation exercises 10 reps/each to increase oropharyngeal swallow function with min cues  4. Complete an objective swallow study (i.e., MBSS) to assess swallow integrity, r/o aspiration, and determine of safest LRD, min A          5/6/2021 1351 by IZZY Siu  Outcome: Resolved/Met     [address] [facility] TREATMENT    Patient: Dami Smith (75 y.o. male)  Date: 5/6/2021  Primary Diagnosis: Colitis [K52.9]  SIRS (systemic inflammatory response syndrome) (HCC) [R65.10]        Precautions: general aspiration precautions     PLOF:regular/thin    ASSESSMENT :  Modified Barium Swallow completed with 0 aspiration evident across all study trials, to include: successive swallows of thin liquids via cup sips; pudding; cracker; and 13 mm barium pill with thin liquid wash. Pt demo: (+) bolus cohesion, manipulation, and propulsion; (+) swallow reflex; and (+) hyolaryngeal excursion. 0 oropharyngeal dysphagia evident at this time. Pt is not an aspiration risk. Pt is safe for regular solid/thin liquid diet; meds per pt prefernce. SLP utilized video of study for visual feedback and recommendations for pt; verbalized comprehension. Treatment:  Skilled therapy initiated: Educated pt on MBS results, aspiration precautions, and importance of compensatory swallow techniques to decrease aspiration risk (decrease rate of intake & sip/bite size, upright @HOB for all po intake and ~30 minutes after po); pt.        Patient will benefit from skilled intervention to address the above impairments. Patient's rehabilitation potential is considered to be Good  Factors which may influence rehabilitation potential include:   [x]              None noted  []              Mental ability/status  []              Medical condition  []              Home/family situation and support systems  []              Safety awareness  []              Pain tolerance/management  []              Other:      PLAN :  Recommendations and Planned Interventions:  Regular/thin; meds per pt prefernce  Frequency/Duration: Maximum therapeutic gains met; safest, least restrictive diet achieved in current in-patient/acute setting. Accordingly, SLP to d/c intervention at this time. Discharge Recommendations: None     SUBJECTIVE:   Patient stated thank you. OBJECTIVE:     Past Medical History:   Diagnosis Date    Arthritis     GERD (gastroesophageal reflux disease)     Hypertension     Stroke [facility])      Past Surgical History:   Procedure Laterality Date    HX CHOLECYSTECTOMY      HX HEENT      blind left eye     HX UROLOGICAL      kiney stone    HX VASECTOMY      CA ABDOMEN SURGERY PROC UNLISTED      appendectomy     Home Situation:   Home Situation  Home Environment: Apartment  # Steps to Enter: 0  One/Two Story Residence: Two story  # of Interior Steps: 0(eletric chair)  Lift Chair Available: Yes  Living Alone: No  Support Systems: Family member(s)  Patient Expects to be Discharged to[de-identified] Apartment  Current DME Used/Available at Home: Cane, straight, Lift chair, Grab bars, Shower chair  Diet prior to admission: regular/thin  Current Diet:  regular/thin   Radiologist:  HRRA        8-point Penetration-Aspiration Scale: Score 1    PAIN:  Pain level pre-treatment: 0/10   Pain level post-treatment: 0/10         COMMUNICATION/EDUCATION:   [x]  Patient educated regarding MBS results and diet recommendations. [x]  Patient/family have participated as able in goal setting and plan of care.   [] Patient/family agree to work toward stated goals and plan of care. []  Patient understands intent and goals of therapy, but is neutral about his/her participation. []  Patient is unable to participate in goal setting and plan of care.     Thank you for this referral.  IZZY Garland  Time Calculation: 27 mins  MBS Time: 18 minutes  Treatment Time: 9 minutes

## 2021-05-06 NOTE — PROGRESS NOTES
2200- assessed patient spoke to wife to go over med list. patient is having low pain 2/10 and does not want anything for it. Admission paperwork complete patient resting    Patient resting  all evening     Bedside shift change report given to [address] (oncoming nurse) by Jessica Parry RN (offgoing nurse). Report included the following information SBAR.

## 2021-05-06 NOTE — PROGRESS NOTES
Problem: Dysphagia (Adult)  Goal: *Acute Goals and Plan of Care (Insert Text)  Description: Recommendations:  Diet: clear liquids until MBS results   Meds: TBD  Aspiration Precautions  Oral Care TID  Other: MBS to follow     Goals:  Patient will:  1. Tolerate diet and/or diet upgrade without overt s/sx of aspiration under SLP supervision  2. Utilize compensatory swallow strategies of small bite/sip, alternate liquid/solid with min cues   3. Perform oral-motor and laryngeal elevation exercises 10 reps/each to increase oropharyngeal swallow function with min cues  4. Complete an objective swallow study (i.e., MBSS) to assess swallow integrity, r/o aspiration, and determine of safest LRD, min A      Outcome: Progressing Towards Goal       SPEECH LANGUAGE PATHOLOGY BEDSIDE SWALLOW   EVALUATION & TREATMENT     Patient: Nas Gallegos (2 y.o. male)  Date: 5/6/2021  Primary Diagnosis: Colitis [K52.9]  SIRS (systemic inflammatory response syndrome) (HCC) [R65.10]        Precautions: aspiration      PLOF: regular/thin    ASSESSMENT :  Based on the objective data described below, the patient presents with mild pharyngeal dysphagia in the setting of general debility. Pt A&Ox4; functional communication/cognition. Reporting onset of coughing with liquids ~4 days ago. Oral-motor exam revealed structures grossly intact for mastication and deglutition. Accepted single and successive sips of thin liquids +/- with intermittent delayed throat clears. At this time, pt would benefit from [facility] to objectively assess oropharyngeal swallow integrity, r/o aspiration, and determine safest, least restrictive diet. D/w RN. TREATMENT :  Skilled therapy initiated; Educated pt on aspiration precautions and importance of compensatory swallow techniques to decrease aspiration risk (decrease rate of intake & sip/bite size, upright @HOB for all po intake and ~30 minutes after po); verbalized comprehension. SLP to follow as indicated. Patient will benefit from skilled intervention to address the above impairments. Patient's rehabilitation potential is considered to be Excellent  Factors which may influence rehabilitation potential include:   [x]            None noted  []            Mental ability/status  []            Medical condition  []            Home/family situation and support systems  []            Safety awareness  []            Pain tolerance/management  []            Other:      PLAN :  Recommendations and Planned Interventions:  MBS to determine safest least restrictive diet  Frequency/Duration: Patient will be followed by speech-language pathology 1-2 times per day/4-7 days per week to address goals. Discharge Recommendations: To Be Determined     SUBJECTIVE:   Patient stated It just started 4-5 days ago.     OBJECTIVE:     Past Medical History:   Diagnosis Date    Arthritis     GERD (gastroesophageal reflux disease)     Hypertension     Stroke [facility])      Past Surgical History:   Procedure Laterality Date    HX CHOLECYSTECTOMY      HX HEENT      blind left eye     HX UROLOGICAL      kiney stone    HX VASECTOMY      AR ABDOMEN SURGERY PROC UNLISTED      appendectomy     Home Situation:   Home Situation  Home Environment: Apartment  # Steps to Enter: 0  One/Two Story Residence: Two story  # of Interior Steps: 0(eletric chair)  Lift Chair Available: Yes  Living Alone: No  Support Systems: Family member(s)  Patient Expects to be Discharged to[de-identified] Apartment  Current DME Used/Available at Home: Cane, straight, Lift chair, Grab bars, Shower chair    Diet prior to admission: regular/thini  Current Diet:  TBD      Cognitive and Communication Status:  Neurologic State: Alert  Orientation Level: Oriented X4  Cognition: Appropriate for age attention/concentration  Perception: Appears intact  Perseveration: No perseveration noted     Oral Assessment:  Oral Assessment  Labial: No impairment  Dentition: Natural  Oral Hygiene: Good  Lingual: No impairment  Velum: No impairment  Mandible: No impairment  P.O. Trials:  Patient Position: HOB 60  Vocal quality prior to P.O.: No impairment  Consistency Presented: Solid; Thin liquid  How Presented: Self-fed/presented;Straw;Successive swallows     Bolus Acceptance: No impairment  Bolus Formation/Control: No impairment     Propulsion: No impairment  Oral Residue: None  Initiation of Swallow: No impairment  Laryngeal Elevation: Functional  Aspiration Signs/Symptoms: Clear throat;Delayed cough/throat clear  Pharyngeal Phase Characteristics: Suspected pharyngeal residue  Effective Modifications: Small sips and bites  Cues for Modifications: Minimal       Oral Phase Severity: No impairment  Pharyngeal Phase Severity : Mild    PAIN:  Pain level pre-treatment: 0/10   Pain level post-treatment: 0/10     After treatment:   []            Patient left in no apparent distress sitting up in chair  [x]            Patient left in no apparent distress in bed  [x]            Call bell left within reach  [x]            Nursing notified  []            Family present  []            Caregiver present  []            Bed alarm activated    COMMUNICATION/EDUCATION:   [x]            Aspiration precautions; swallow safety; compensatory techniques. [x]            Patient/family have participated as able in goal setting and plan of care. [x]            Patient/family agree to work toward stated goals and plan of care. []            Patient understands intent and goals of therapy; neutral about participation. []            Patient unable to participate in goal setting/plan of care; educ ongoing with interdisciplinary staff  []         Posted safety precautions in patient's room.     Thank you for this referral.  IZZY Maldonado  Time Calculation: 25 mins  Evaluation Time: 16 minutes   Treatment Time: 9 minutes

## 2021-05-06 NOTE — H&P
History & Physical    Patient: Leonidas Bautista MRN: [id number]  CSN: [id number]    YOB: 1935  Age: 81 y.o. Sex: male      DOA: 5/5/2021  Primary Care Provider:  Yoon Mcginnis MD      Assessment/Plan     Patient Active Problem List   Diagnosis Code    Colitis K52.9    SIRS (systemic inflammatory response syndrome) ([facility]) R65.10    HTN (hypertension) I10    CKD (chronic kidney disease) stage 3, GFR 30-59 ml/min ([facility]) N18.30    Cough R5    73-year-old male with hypertension, prior stroke, and CKD stage III admitted with colitis and SIRS. Colitis with SIRS  Clear liquid diet  IV fluid hydration  --Follow up blood culture  Levaquin and Flagyl for antibiotic treatment  General surgery consult    Cough-possibly due to aspiration  Swallow study with speech therapy    Hypertension  Hold home medications as blood pressures have been low    CKD stage IIIat baseline  Trend creatinine  Avoid nephrotoxins    Full code    Prophylaxis: Pepcid IV, Lovenox SQ    Estimated length of stay : 3 nights    sEmer Engle MD  Nocturnist  ----------------------------------------------------------------------------------------------------------------------------------------------------------------------------------------------------------------  CC: Abdominal pain       HPI:     Leonidas Bautista is a 81 y.o. male with hypertension, prior stroke, and CKD stage III presents to the ED with right sided abdominal pain, fever, and weakness for the past 3 days. He has had nonbloody diarrhea for the past 2 days but has not had any nausea or vomiting. He had a prior appendectomy. Of note, he also states that he has a cough when he drinks liquids but does not have any trouble with solid food. He does not have any pain with swallowing and denies any weight loss.     Past Medical History:   Diagnosis Date    Arthritis     GERD (gastroesophageal reflux disease)     Hypertension     Stroke [facility])        Past Surgical History:   Procedure Laterality Date    HX CHOLECYSTECTOMY      HX HEENT      blind left eye     HX UROLOGICAL      kiney stone    HX VASECTOMY      [place] ABDOMEN SURGERY PROC UNLISTED      appendectomy       Family History   Problem Relation Age of Onset    Asthma Father     Heart Disease Father        Social History     Socioeconomic History    Marital status:      Spouse name: Not on file    Number of children: Not on file    Years of education: Not on file    Highest education level: Not on file   Tobacco Use    Smoking status: Former Smoker    Smokeless tobacco: Never Used   Substance and Sexual Activity    Alcohol use: Not Currently   Other Topics Concern       Prior to Admission medications    Medication Sig Start Date End Date Taking? Authorizing Provider   clopidogreL (Plavix) 75 mg tab Take 75 mg by mouth daily. Yes Provider, Historical   metoprolol succinate (TOPROL-XL) 50 mg XL tablet Take 50 mg by mouth two (2) times a day. Yes Provider, Historical   amLODIPine (NORVASC) 5 mg tablet Take 5 mg by mouth daily. Yes Provider, Historical   magnesium oxide (MAG-OX) 400 mg tablet Take 400 mg by mouth daily. Yes Provider, Historical   potassium chloride SR (KLOR-CON 10) 10 mEq tablet Take  by mouth. Yes Provider, Historical   lisinopriL (PRINIVIL, ZESTRIL) 10 mg tablet Take 10 mg by mouth daily. Yes Provider, Historical   peg 400-propylene glycol (SYSTANE, PROPYLENE GLYCOL,) 0.4-0.3 % drop as needed. Yes Provider, Historical   aspirin delayed-release 81 mg tablet Take 81 mg by mouth daily. Yes Provider, Historical   atenolol (TENORMIN) 100 mg tablet Take 100 mg by mouth daily. Yes Provider, Historical   doxazosin (CARDURA) 8 mg tablet Take 8 mg by mouth daily. Yes Provider, Historical   simvastatin (ZOCOR) 40 mg tablet Take  by mouth nightly.    Yes Provider, Historical   vit A/C/E ac/ZnOx/cupric oxide (EYE VITAMIN AND MINERALS PO) Take  by mouth two (2) times a day. Provider, Historical   benzonatate (TESSALON) 100 mg capsule Take 100 mg by mouth three (3) times daily as needed for Cough. Provider, Historical   hydroCHLOROthiazide (HYDRODIURIL) 25 mg tablet Take 25 mg by mouth daily. Provider, Historical       Allergies   Allergen Reactions    Pcn [Penicillins] Rash       Review of Systems  Gen: +fever, chills, malaise, no weight loss/gain. Heent: No headache, rhinorrhea, sore throat. Heart: No chest pain, palpitations, OLVERA, pnd, or orthopnea. Resp: +cough, no hemoptysis, wheezing or shortness of breath. GI: No nausea, vomiting, +diarrhea, no constipation, melena or hematochezia. : No urinary obstruction, dysuria or hematuria. Derm: No rash, new skin lesion or pruritis. Musc/skeletal: no bone or joint complaints. Vasc: No edema, cyanosis or claudication. Endo: No heat/cold intolerance, no polyuria,polydipsia or polyphagia. Neuro: left sided leg weakness from prior stroke. No seizures. Heme: No easy bruising or bleeding. Physical Exam:     Physical Exam:  Visit Vitals  /60   Pulse 75   Temp 98 °F (36.7 °C)   Resp 16   Ht 5' 2\" (1.575 m)   Wt 68 kg (150 lb)   SpO2 100%   BMI 27.44 kg/m²      O2 Device: None (Room air)    Temp (24hrs), Av.3 °F (37.4 °C), Min:98 °F (36.7 °C), Max:101.3 °F (38.5 °C)    No intake/output data recorded.  1901 -  0700  In: 100 [I.V.:100]  Out: -     General:  Awake, cooperative, no distress. Head:  Normocephalic, without obvious abnormality, atraumatic. Eyes:  Conjunctivae/corneas clear, sclera anicteric. Lungs:   Clear to auscultation bilaterally. Heart:  Regular rate and rhythm, S1, S2 normal, no murmur, click, rub or gallop. Abdomen: Soft, tender to palpation on the right side of the abdomen without rebound tenderness. Bowel sounds normal. No masses,  No organomegaly. Extremities: Extremities normal, atraumatic, no cyanosis or edema.  Capillary refill normal.   Pulses: 2+ and symmetric all extremities. Skin: Skin color pink, turgor normal. No rashes or lesions   Neurologic: No focal motor or sensory deficit. Labs Reviewed: All lab results for the last 24 hours reviewed. Recent Results (from the past 24 hour(s))   URINALYSIS W/ RFLX MICROSCOPIC    Collection Time: 05/05/21  4:55 PM   Result Value Ref Range    Color DARK YELLOW      Appearance CLEAR      Specific gravity >1.030 (H) 1.005 - 1.030    pH (UA) 5.5 5.0 - 8.0      Protein 30 (A) NEG mg/dL    Glucose 100 (A) NEG mg/dL    Ketone TRACE (A) NEG mg/dL    Bilirubin SMALL (A) NEG      Blood Negative NEG      Urobilinogen 1.0 0.2 - 1.0 EU/dL    Nitrites Negative NEG      Leukocyte Esterase Negative NEG     SARS-COV-2    Collection Time: 05/05/21  4:55 PM   Result Value Ref Range    SARS-CoV-2 Please find results under separate order     URINE MICROSCOPIC ONLY    Collection Time: 05/05/21  4:55 PM   Result Value Ref Range    WBC 0 to 3 0 - 5 /hpf    RBC 0 to 3 0 - 5 /hpf    Epithelial cells FEW 0 - 5 /lpf    Bacteria 1+ (A) NEG /hpf    Mucus 3+ (A) NEG /lpf    Hyaline cast 0 to 3 0 - 2 /lpf   CBC WITH AUTOMATED DIFF    Collection Time: 05/05/21  4:56 PM   Result Value Ref Range    WBC 16.9 (H) 4.6 - 13.2 K/uL    RBC 4.37 4.35 - 5.65 M/uL    HGB 13.5 13.0 - 16.0 g/dL    HCT 39.4 36.0 - 48.0 %    MCV 90.2 74.0 - 97.0 FL    MCH 30.9 24.0 - 34.0 PG    MCHC 34.3 31.0 - 37.0 g/dL    RDW 12.9 11.6 - 14.5 %    PLATELET 349 917 - 196 K/uL    MPV 11.4 9.2 - 11.8 FL    NEUTROPHILS 76 (H) 40 - 73 %    LYMPHOCYTES 15 (L) 21 - 52 %    MONOCYTES 9 3 - 10 %    EOSINOPHILS 0 0 - 5 %    BASOPHILS 0 0 - 2 %    ABS. NEUTROPHILS 12.8 (H) 1.8 - 8.0 K/UL    ABS. LYMPHOCYTES 2.5 0.9 - 3.6 K/UL    ABS. MONOCYTES 1.5 (H) 0.05 - 1.2 K/UL    ABS. EOSINOPHILS 0.0 0.0 - 0.4 K/UL    ABS.  BASOPHILS 0.0 0.0 - 0.1 K/UL    DF AUTOMATED     METABOLIC PANEL, COMPREHENSIVE    Collection Time: 05/05/21  4:56 PM   Result Value Ref Range Sodium 140 136 - 145 mmol/L    Potassium 3.9 3.5 - 5.5 mmol/L    Chloride 108 100 - 111 mmol/L    CO2 24 21 - 32 mmol/L    Anion gap 8 3.0 - 18 mmol/L    Glucose 98 74 - 99 mg/dL    BUN 23 (H) 7.0 - 18 MG/DL    Creatinine 1.39 (H) 0.6 - 1.3 MG/DL    BUN/Creatinine ratio 17 12 - 20      GFR est AA 59 (L) >60 ml/min/1.73m2    GFR est non-AA 49 (L) >60 ml/min/1.73m2    Calcium 8.6 8.5 - 10.1 MG/DL    Bilirubin, total 0.7 0.2 - 1.0 MG/DL    ALT (SGPT) 32 16 - 61 U/L    AST (SGOT) 18 10 - 38 U/L    Alk.  phosphatase 40 (L) 45 - 117 U/L    Protein, total 6.5 6.4 - 8.2 g/dL    Albumin 3.7 3.4 - 5.0 g/dL    Globulin 2.8 2.0 - 4.0 g/dL    A-G Ratio 1.3 0.8 - 1.7     LIPASE    Collection Time: 05/05/21  4:56 PM   Result Value Ref Range    Lipase 72 (L) 73 - 393 U/L   CARDIAC PANEL,(CK, CKMB & TROPONIN)    Collection Time: 05/05/21  4:56 PM   Result Value Ref Range    CK - MB 1.9 <3.6 ng/ml    CK-MB Index 1.4 0.0 - 4.0 %     39 - 308 U/L    Troponin-I, QT <0.02 0.0 - 0.045 NG/ML   COVID-19 RAPID TEST    Collection Time: 05/05/21  4:56 PM   Result Value Ref Range    Specimen source Nasopharyngeal      COVID-19 rapid test Not detected NOTD     EKG, 12 LEAD, INITIAL    Collection Time: 05/05/21  5:03 PM   Result Value Ref Range    Ventricular Rate 77 BPM    Atrial Rate 77 BPM    P-R Interval 216 ms    QRS Duration 114 ms    Q-T Interval 362 ms    QTC Calculation (Bezet) 409 ms    Calculated P Axis 23 degrees    Calculated R Axis -40 degrees    Calculated T Axis 100 degrees    Diagnosis       Sinus rhythm with sinus arrhythmia with 1st degree AV block  Left axis deviation  Septal infarct , age undetermined  Possible Lateral infarct , age undetermined  Abnormal ECG  When compared with ECG of 09-JAN-2020 08:28,  premature atrial complexes are no longer present  Septal infarct is now present  T wave inversion no longer evident in Inferior leads  Confirmed by Conchita Lerner MD. ([id number]) on 5/5/2021 10:10:54 PM     POC LACTIC ACID Collection Time: 05/05/21  7:04 PM   Result Value Ref Range    Lactic Acid (POC) 0.74 0.40 - 5.45 mmol/L   METABOLIC PANEL, COMPREHENSIVE    Collection Time: 05/06/21  3:30 AM   Result Value Ref Range    Sodium 142 136 - 145 mmol/L    Potassium 3.9 3.5 - 5.5 mmol/L    Chloride 107 100 - 111 mmol/L    CO2 29 21 - 32 mmol/L    Anion gap 6 3.0 - 18 mmol/L    Glucose 93 74 - 99 mg/dL    BUN 19 (H) 7.0 - 18 MG/DL    Creatinine 1.34 (H) 0.6 - 1.3 MG/DL    BUN/Creatinine ratio 14 12 - 20      GFR est AA >60 >60 ml/min/1.73m2    GFR est non-AA 51 (L) >60 ml/min/1.73m2    Calcium 8.5 8.5 - 10.1 MG/DL    Bilirubin, total 0.7 0.2 - 1.0 MG/DL    ALT (SGPT) 27 16 - 61 U/L    AST (SGOT) 16 10 - 38 U/L    Alk. phosphatase 40 (L) 45 - 117 U/L    Protein, total 6.0 (L) 6.4 - 8.2 g/dL    Albumin 3.2 (L) 3.4 - 5.0 g/dL    Globulin 2.8 2.0 - 4.0 g/dL    A-G Ratio 1.1 0.8 - 1.7     CBC W/O DIFF    Collection Time: 05/06/21  3:30 AM   Result Value Ref Range    WBC 12.6 4.6 - 13.2 K/uL    RBC 4.19 (L) 4.35 - 5.65 M/uL    HGB 13.0 13.0 - 16.0 g/dL    HCT 38.2 36.0 - 48.0 %    MCV 91.2 74.0 - 97.0 FL    MCH 31.0 24.0 - 34.0 PG    MCHC 34.0 31.0 - 37.0 g/dL    RDW 13.1 11.6 - 14.5 %    PLATELET 638 (L) 102 - 420 K/uL    MPV 11.6 9.2 - 11.8 FL   Results  XR CHEST PORT (Accession [id number]) (Order [id number])  Allergies     Not Specified: Pcn [Penicillins]   Exam Information    Status Exam Begun  Exam Ended    Final [99] 5/05/2021 18:26 5/05/2021  6:34 PM [id number]  6:34 PM   Result Information    Status: Final result (Exam End: 5/5/2021 18:34) Provider Status: Open   Study Result    EXAM: Portable upright chest radiograph.     INDICATION: \"meets SIRS criteria. \"     COMPARISON: None.     _______________     FINDINGS:        LUNGS:             --Expansion:  Adequate. --Consolidation:  None detected. --Pulmonary edema:  None detected.            --Other:  Non-applicable.        PLEURAL SPACE:           --Pleural effusion: None detected. --Pneumothorax:  None detected.     _______________     IMPRESSION     Negative radiographic examination.         Results  CT ABD PELV W CONT (Accession [id number]) (Order [id number])  Allergies     Not Specified: Pcn [Penicillins]   Exam Information    Status Exam Begun  Exam Ended    Final [99] 5/05/2021 17:50 5/05/2021  5:59 PM [id number]  5:59 PM   Result Information    Status: Final result (Exam End: 5/5/2021 17:59) Provider Status: Open   Study Result    EXAM: CT of the abdomen and pelvis with intravenous contrast.     INDICATION:  \"abd pain, fever. \"      COMPARISON:  No relevant prior imaging is available for comparison at the time  of dictation.     DOSE REDUCTION AND DICOM INFORMATION: One or more dose reduction techniques were  used on this CT: automated exposure control, adjustment of the mAs and/or kVp  according to patient size, and iterative reconstruction techniques. The  specific techniques used on this CT exam have been documented in the patient's  electronic medical record. Digital [employer] in [employer]  (DICOM) format image data are available to nonaffiliated external healthcare  facilities or entities on a secure, media free, reciprocally searchable basis  with patient authorization for at least a 12-month period after this study.     _______________     FINDINGS:          CHEST BASE: Endovascular aortic valve prosthesis. LAD stent. Low grade  multichamber cardiac enlargement.          LIVER: Unremarkable.          GALLBLADDER: Unremarkable.          BILE DUCTS: Unremarkable.          PANCREAS: Unremarkable.          SPLEEN: Unremarkable.          GASTROINTESTINAL TRACT AND PERITONEAL CAVITY: Cecal resection with  ileocolic anastomosis. Marked circumferential bowel wall thickening involving  the right colon through the hepatic flexure with surrounding soft tissue  stranding.  No significant wall thickening in the adjacent neoterminal ileum.    Lower esophagus:  Unremarkable. Hiatal hernia:  No.            Diverticulosis:  Marked. Ascites:  No.            Bowel obstruction:  No.            Pneumoperitoneum:  No.          ADRENALS:                Right:  Unremarkable.               Left:  Unremarkable.          KIDNEYS, URETERS, BLADDER:               Calculi:  None detected.               Right:  Small cyst with minimal peripheral calcification does not  require specific follow-up. Otherwise unremarkable.               Left:  Large simple cyst that does not require specific follow-up. Otherwise unremarkable.               Bladder:  Unremarkable.          VASCULATURE: Aortoiliac atherosclerosis without AAA.            LYMPH NODES: Unremarkable.          REPRODUCTIVE ORGANS: Low-grade prostatomegaly.           SUPERFICIAL SOFT TISSUES: Right ventral abdominal wall postsurgical muscle  atrophy.          BONES:  Low-grade scoliosis. Marked hyperlordosis. Advanced chronic disc  and facet degeneration. Small lucent lesion in the T11 vertebral body has  minimal internal trabeculation, which is suggestive of a benign hemangioma. A 15  mm sclerotic lesion in the T10 vertebral body is nonspecific but statistically  most likely benign as an isolated abnormality, likely an enostosis.     _______________     IMPRESSION     Long segment colitis involving the postsurgical right colon.     Differential diagnosis includes infectious, inflammatory/autoimmune, and  ischemic etiologies, less likely neoplastic/lymphomatous. Correlate with history  regarding the circumstances surrounding the surgery.     Follow-up imaging to assess therapeutic response and exclude underlying  malignancy is recommended.     Chronic ancillary findings, as described.

## 2021-05-06 NOTE — ED NOTES
TRANSFER - OUT REPORT:    Verbal report given to [employer] (name) on Dami Smith  being transferred to [address] medical unit (unit) for routine progression of care       Report consisted of patients Situation, Background, Assessment and   Recommendations(SBAR). Information from the following report(s) SBAR, ED Summary, Mariea Schilder and Recent Results was reviewed with the receiving nurse. Lines:   Peripheral IV 05/05/21 Right Forearm (Active)        Opportunity for questions and clarification was provided.       Patient transported with:   [employer]

## 2021-05-06 NOTE — ROUTINE PROCESS
2200- TRANSFER - IN REPORT: 
 
Verbal report received from [facility]) on [address]  being received from ED(unit) for routine progression of care Report consisted of patients Situation, Background, Assessment and  
Recommendations(SBAR). Information from the following report(s) SBAR and ED Summary was reviewed with the receiving nurse. Opportunity for questions and clarification was provided. Assessment completed upon patients arrival to unit and care assumed.

## 2021-05-06 NOTE — CONSULTS
Consult Note    Patient: Anabel Castro MRN: [id number]  CSN: [id number]    YOB: 1935  Age: 81 y.o. Sex: male    DOA: 5/5/2021 LOS:  LOS: 1 day        Requesting Physician: ED  Reason for Consultation: Colitis               HPI:     Anabel Castro is a 81 y.o. male who has been seen for colitis seen on CT, previous colectomy- unknown why. Past Medical History:   Diagnosis Date    Arthritis     GERD (gastroesophageal reflux disease)     Hypertension     Stroke [facility])        Past Surgical History:   Procedure Laterality Date    HX CHOLECYSTECTOMY      HX HEENT      blind left eye     HX UROLOGICAL      kiney stone    HX VASECTOMY      [place] ABDOMEN SURGERY PROC UNLISTED      appendectomy       Family History   Problem Relation Age of Onset    Asthma Father     Heart Disease Father        Social History     Socioeconomic History    Marital status:      Spouse name: Not on file    Number of children: Not on file    Years of education: Not on file    Highest education level: Not on file   Tobacco Use    Smoking status: Former Smoker    Smokeless tobacco: Never Used   Substance and Sexual Activity    Alcohol use: Not Currently   Other Topics Concern       Prior to Admission medications    Medication Sig Start Date End Date Taking? Authorizing Provider   clopidogreL (Plavix) 75 mg tab Take 75 mg by mouth daily. Yes Provider, Historical   metoprolol succinate (TOPROL-XL) 50 mg XL tablet Take 50 mg by mouth two (2) times a day. Yes Provider, Historical   amLODIPine (NORVASC) 5 mg tablet Take 5 mg by mouth daily. Yes Provider, Historical   magnesium oxide (MAG-OX) 400 mg tablet Take 400 mg by mouth daily. Yes Provider, Historical   potassium chloride SR (KLOR-CON 10) 10 mEq tablet Take  by mouth. Yes Provider, Historical   lisinopriL (PRINIVIL, ZESTRIL) 10 mg tablet Take 10 mg by mouth daily.    Yes Provider, Historical   peg 400-propylene glycol (SYSTANE, PROPYLENE GLYCOL,) 0.4-0.3 % drop as needed. Yes Provider, Historical   aspirin delayed-release 81 mg tablet Take 81 mg by mouth daily. Yes Provider, Historical   atenolol (TENORMIN) 100 mg tablet Take 100 mg by mouth daily. Yes Provider, Historical   doxazosin (CARDURA) 8 mg tablet Take 8 mg by mouth daily. Yes Provider, Historical   simvastatin (ZOCOR) 40 mg tablet Take  by mouth nightly. Yes Provider, Historical   vit A/C/E ac/ZnOx/cupric oxide (EYE VITAMIN AND MINERALS PO) Take  by mouth two (2) times a day. Provider, Historical   benzonatate (TESSALON) 100 mg capsule Take 100 mg by mouth three (3) times daily as needed for Cough. Provider, Historical   hydroCHLOROthiazide (HYDRODIURIL) 25 mg tablet Take 25 mg by mouth daily. Provider, Historical       Allergies   Allergen Reactions    Pcn [Penicillins] Rash       Review of Systems  A comprehensive review of systems was negative except for that written in the History of Present Illness. Physical Exam:      Visit Vitals  /60   Pulse 75   Temp 98 °F (36.7 °C)   Resp 16   Ht 5' 2\" (1.575 m)   Wt 68 kg (150 lb)   SpO2 100%   BMI 27.44 kg/m²       Physical Exam:  Pertinent items are noted in HPI. Labs Reviewed: All lab results for the last 24 hours reviewed. Assessment/Plan     Principal Problem:    Colitis (5/5/2021)    Active Problems:    SIRS (systemic inflammatory response syndrome) ([facility] (5/5/2021)      HTN (hypertension) (5/6/2021)      CKD (chronic kidney disease) stage 3, GFR 30-59 ml/min ([facility]) (5/6/2021)      Cough (5/6/2021)        Colitis - unknown etiology. No surgical problems. Recommend medical management and or GI consult, may need cscope at some point. Will sign off.

## 2021-05-06 NOTE — PROGRESS NOTES
Reason for Admission:  Abdominal pain                     RUR Score:    Low; 13%                 Plan for utilizing home health:   Unlikely        PCP: First and Last name:  Tk Hyde MD     Name of Practice:    Are you a current patient: Yes/No:    Approximate date of last visit:    Can you participate in a virtual visit with your PCP:                     Current Advanced Directive/Advance Care Plan: Full Code      Healthcare Decision Maker:   Click here to complete [place] including selection of the [place] Relationship (ie \"Primary\")             Primary Decision MakerErmali Berger - Spouse - [phone]                  Transition of Care Plan:     Home with physician follow up                 Chart reviewed. Per H&P Carlos Alberto Moseley is a 81 y.o. male with hypertension, prior stroke, and CKD stage III presents to the ED with right sided abdominal pain, fever, and weakness for the past 3 days. He has had nonbloody diarrhea for the past 2 days but has not had any nausea or vomiting. He had a prior appendectomy. Of note, he also states that he has a cough when he drinks liquids but does not have any trouble with solid food. He does not have any pain with swallowing and denies any weight loss. \"    CM met with pt at bedside to discuss transition of care. Pt is  and ambulates with a cane as needed. Pt also has a shower chair, stair lift and a ramp to enter into the home. Pt is independent with his cane. Pt's wife will assist as needed upon discharge. Anticipate pt will transition home with physician follow up when medically stable. Please encourage ambulation or order therapy services as appropriate to assist with identifying potential transition of care needs. CM to continue to follow and assist.    Care Management Interventions  Mode of Transport at Discharge:  Other (see comment)(spouse)  Transition of Care Consult (CM Consult): Discharge Planning  Health Maintenance Reviewed: Yes  Speech Therapy Consult: Yes  Current Support Network: Lives with Spouse  Confirm Follow Up Transport: Family  The Plan for Transition of Care is Related to the Following Treatment Goals : Home with physician follow up  Discharge Location  Discharge Placement: Home with family assistance

## 2021-05-07 PROBLEM — Z95.2 S/P TAVR (TRANSCATHETER AORTIC VALVE REPLACEMENT): Status: ACTIVE | Noted: 2021-05-07

## 2021-05-07 PROBLEM — I25.10 CAD (CORONARY ARTERY DISEASE): Status: ACTIVE | Noted: 2021-05-07

## 2021-05-07 LAB
ALBUMIN SERPL-MCNC: 3 G/DL (ref 3.4–5)
ALBUMIN/GLOB SERPL: 1.2 {RATIO} (ref 0.8–1.7)
ALP SERPL-CCNC: 36 U/L (ref 45–117)
ALT SERPL-CCNC: 23 U/L (ref 16–61)
ANION GAP SERPL CALC-SCNC: 5 MMOL/L (ref 3–18)
AST SERPL-CCNC: 15 U/L (ref 10–38)
BASOPHILS # BLD: 0 K/UL (ref 0–0.1)
BASOPHILS NFR BLD: 0 % (ref 0–2)
BILIRUB SERPL-MCNC: 0.4 MG/DL (ref 0.2–1)
BUN SERPL-MCNC: 14 MG/DL (ref 7–18)
BUN/CREAT SERPL: 13 (ref 12–20)
CALCIUM SERPL-MCNC: 7.8 MG/DL (ref 8.5–10.1)
CHLORIDE SERPL-SCNC: 111 MMOL/L (ref 100–111)
CO2 SERPL-SCNC: 27 MMOL/L (ref 21–32)
CREAT SERPL-MCNC: 1.06 MG/DL (ref 0.6–1.3)
DIFFERENTIAL METHOD BLD: ABNORMAL
EOSINOPHIL # BLD: 0.1 K/UL (ref 0–0.4)
EOSINOPHIL NFR BLD: 2 % (ref 0–5)
ERYTHROCYTE [DISTWIDTH] IN BLOOD BY AUTOMATED COUNT: 12.8 % (ref 11.6–14.5)
ERYTHROCYTE [DISTWIDTH] IN BLOOD BY AUTOMATED COUNT: 12.8 % (ref 11.6–14.5)
GLOBULIN SER CALC-MCNC: 2.5 G/DL (ref 2–4)
GLUCOSE SERPL-MCNC: 93 MG/DL (ref 74–99)
HCT VFR BLD AUTO: 33 % (ref 36–48)
HCT VFR BLD AUTO: 34.6 % (ref 36–48)
HGB BLD-MCNC: 11.3 G/DL (ref 13–16)
HGB BLD-MCNC: 12 G/DL (ref 13–16)
LYMPHOCYTES # BLD: 1.6 K/UL (ref 0.9–3.6)
LYMPHOCYTES NFR BLD: 22 % (ref 21–52)
MCH RBC QN AUTO: 30.8 PG (ref 24–34)
MCH RBC QN AUTO: 31.1 PG (ref 24–34)
MCHC RBC AUTO-ENTMCNC: 34.2 G/DL (ref 31–37)
MCHC RBC AUTO-ENTMCNC: 34.7 G/DL (ref 31–37)
MCV RBC AUTO: 89.6 FL (ref 74–97)
MCV RBC AUTO: 89.9 FL (ref 74–97)
MONOCYTES # BLD: 0.6 K/UL (ref 0.05–1.2)
MONOCYTES NFR BLD: 8 % (ref 3–10)
NEUTS SEG # BLD: 4.9 K/UL (ref 1.8–8)
NEUTS SEG NFR BLD: 68 % (ref 40–73)
PLATELET # BLD AUTO: 116 K/UL (ref 135–420)
PLATELET # BLD AUTO: 99 K/UL (ref 135–420)
PMV BLD AUTO: 11.2 FL (ref 9.2–11.8)
PMV BLD AUTO: 11.3 FL (ref 9.2–11.8)
POTASSIUM SERPL-SCNC: 3.3 MMOL/L (ref 3.5–5.5)
PROT SERPL-MCNC: 5.5 G/DL (ref 6.4–8.2)
RBC # BLD AUTO: 3.67 M/UL (ref 4.35–5.65)
RBC # BLD AUTO: 3.86 M/UL (ref 4.35–5.65)
SODIUM SERPL-SCNC: 143 MMOL/L (ref 136–145)
WBC # BLD AUTO: 7.1 K/UL (ref 4.6–13.2)
WBC # BLD AUTO: 7.2 K/UL (ref 4.6–13.2)

## 2021-05-07 PROCEDURE — 65270000029 HC RM PRIVATE

## 2021-05-07 PROCEDURE — 74011000250 HC RX REV CODE- 250: Performed by: FAMILY MEDICINE

## 2021-05-07 PROCEDURE — 74011250636 HC RX REV CODE- 250/636: Performed by: PHYSICIAN ASSISTANT

## 2021-05-07 PROCEDURE — 74011250637 HC RX REV CODE- 250/637: Performed by: HOSPITALIST

## 2021-05-07 PROCEDURE — 97161 PT EVAL LOW COMPLEX 20 MIN: CPT

## 2021-05-07 PROCEDURE — 80053 COMPREHEN METABOLIC PANEL: CPT

## 2021-05-07 PROCEDURE — 74011250636 HC RX REV CODE- 250/636: Performed by: FAMILY MEDICINE

## 2021-05-07 PROCEDURE — 76450000000

## 2021-05-07 PROCEDURE — 85025 COMPLETE CBC W/AUTO DIFF WBC: CPT

## 2021-05-07 PROCEDURE — 97535 SELF CARE MNGMENT TRAINING: CPT

## 2021-05-07 PROCEDURE — 97165 OT EVAL LOW COMPLEX 30 MIN: CPT

## 2021-05-07 PROCEDURE — 74011250637 HC RX REV CODE- 250/637: Performed by: FAMILY MEDICINE

## 2021-05-07 PROCEDURE — 36415 COLL VENOUS BLD VENIPUNCTURE: CPT

## 2021-05-07 PROCEDURE — 85027 COMPLETE CBC AUTOMATED: CPT

## 2021-05-07 PROCEDURE — 99222 1ST HOSP IP/OBS MODERATE 55: CPT | Performed by: NURSE PRACTITIONER

## 2021-05-07 PROCEDURE — 97116 GAIT TRAINING THERAPY: CPT

## 2021-05-07 RX ORDER — METOPROLOL SUCCINATE 50 MG/1
50 TABLET, EXTENDED RELEASE ORAL 2 TIMES DAILY
Status: DISCONTINUED | OUTPATIENT
Start: 2021-05-07 | End: 2021-05-08 | Stop reason: HOSPADM

## 2021-05-07 RX ADMIN — METRONIDAZOLE 500 MG: 500 INJECTION, SOLUTION INTRAVENOUS at 12:24

## 2021-05-07 RX ADMIN — Medication 10 ML: at 21:18

## 2021-05-07 RX ADMIN — ATORVASTATIN CALCIUM 20 MG: 20 TABLET, FILM COATED ORAL at 21:18

## 2021-05-07 RX ADMIN — METOPROLOL SUCCINATE 50 MG: 50 TABLET, EXTENDED RELEASE ORAL at 21:17

## 2021-05-07 RX ADMIN — CLOPIDOGREL BISULFATE 75 MG: 75 TABLET ORAL at 12:24

## 2021-05-07 RX ADMIN — Medication 10 ML: at 14:57

## 2021-05-07 RX ADMIN — METRONIDAZOLE 500 MG: 500 INJECTION, SOLUTION INTRAVENOUS at 04:15

## 2021-05-07 RX ADMIN — Medication 81 MG: at 12:24

## 2021-05-07 RX ADMIN — SODIUM CHLORIDE 75 ML/HR: 900 INJECTION, SOLUTION INTRAVENOUS at 17:48

## 2021-05-07 RX ADMIN — LEVOFLOXACIN 750 MG: 5 INJECTION, SOLUTION INTRAVENOUS at 21:18

## 2021-05-07 RX ADMIN — FAMOTIDINE 20 MG: 10 INJECTION INTRAVENOUS at 21:18

## 2021-05-07 RX ADMIN — SODIUM CHLORIDE 75 ML/HR: 900 INJECTION, SOLUTION INTRAVENOUS at 09:54

## 2021-05-07 RX ADMIN — METRONIDAZOLE 500 MG: 500 INJECTION, SOLUTION INTRAVENOUS at 17:48

## 2021-05-07 RX ADMIN — FAMOTIDINE 20 MG: 10 INJECTION INTRAVENOUS at 09:49

## 2021-05-07 RX ADMIN — Medication 1 TABLET: at 09:49

## 2021-05-07 RX ADMIN — DOXAZOSIN 8 MG: 4 TABLET ORAL at 09:49

## 2021-05-07 RX ADMIN — METOPROLOL SUCCINATE 50 MG: 50 TABLET, EXTENDED RELEASE ORAL at 12:27

## 2021-05-07 NOTE — PROGRESS NOTES
Hospitalist Progress Note-critical care note     Patient: Дмитрий Sullivan MRN: [id number]  CSN: [id number]    YOB: 1935  Age: 81 y.o. Sex: male    DOA: 5/5/2021 LOS:  LOS: 2 days            Chief complaint:cad colitis, tavr , ckd3     Assessment/Plan         Hospital Problems  Date Reviewed: 5/6/2021          Codes Class Noted POA    CAD (coronary artery disease) ICD-10-CM: I25.10  ICD-9-CM: 414.00  5/7/2021 Unknown        S/P TAVR (transcatheter aortic valve replacement) ICD-10-CM: Z95.2  ICD-9-CM: V43.3  5/7/2021 Unknown        HTN (hypertension) ICD-10-CM: I10  ICD-9-CM: 401.9  5/6/2021 Yes        CKD (chronic kidney disease) stage 3, GFR 30-59 ml/min ([facility] ICD-10-CM: N18.30  ICD-9-CM: 585.3  5/6/2021 Yes        Cough ICD-10-CM: R05  ICD-9-CM: 786.2  5/6/2021 Yes        * (Principal) Colitis ICD-10-CM: K52.9  ICD-9-CM: 558.9  5/5/2021 Yes        SIRS (systemic inflammatory response syndrome) ([facility] ICD-10-CM: R65.10  ICD-9-CM: 995.90  5/5/2021 Yes               60-year-old male with hypertension, prior stroke, and CKD stage III admitted with colitis and SIRS.    Colitis with SIRS  No diarrhea today  Continue Levaquin and Cipro  No diarrhea today  Appreciate surgery on board  Recommended medical treatment and  possibly GI consult if symptoms  Blood culture negative    Hypertension  Restart metoprolol, need to verify home medication, metoprolol and atenolol both in the chart. Discussed with RN to verify home medication    CAD, SP TAVR  Follow-up with Dr. Archana Gracia as outpatient  Continue aspirin and Plavix    CKD stage III  Creatinine is good now     Will recheck CBC, platelets 47L no bleeding, continue aspirin and Plavix. Subjective: No diarrhea or so far no abdominal pain, follow-up with Dr. Archana Gracia for heart disease    RN platelet was low,   Disposition :1-2 days   Review of systems:    General: No fevers or chills. Cardiovascular: No chest pain or pressure. No palpitations.    Pulmonary: No shortness of breath. Gastrointestinal: No nausea, vomiting. Vital signs/Intake and Output:  Visit Vitals  /70   Pulse 84   Temp 98.1 °F (36.7 °C)   Resp 18   Ht 5' 2\" (1.575 m)   Wt 82 kg (180 lb 11.2 oz)   SpO2 98%   BMI 33.05 kg/m²     Current Shift:  05/07 0701 - 05/07 1900  In: 480 [P.O.:480]  Out: -   Last three shifts:  05/05 1901 - 05/07 0700  In: 100 [I.V.:100]  Out: -     Physical Exam:  General: WD, WN. Alert, cooperative, no acute distress    HEENT: NC, Atraumatic. PERRLA, anicteric sclerae. Lungs: CTA Bilaterally. No Wheezing/Rhonchi/Rales. Heart:  Regular  rhythm,  No murmur, No Rubs, No Gallops  Abdomen: Soft, Non distended, Non tender. +Bowel sounds,   Extremities: No c/c/e  Psych:   Not anxious or agitated. Neurologic:  No acute neurological deficit. Labs: Results:       Chemistry Recent Labs     05/07/21  0140 05/06/21  0330 05/05/21  1656   GLU 93 93 98    142 140   K 3.3* 3.9 3.9    107 108   CO2 27 29 24   BUN 14 19* 23*   CREA 1.06 1.34* 1.39*   CA 7.8* 8.5 8.6   AGAP 5 6 8   BUCR 13 14 17   AP 36* 40* 40*   TP 5.5* 6.0* 6.5   ALB 3.0* 3.2* 3.7   GLOB 2.5 2.8 2.8   AGRAT 1.2 1.1 1.3      CBC w/Diff Recent Labs     05/07/21 0140 05/06/21  0330 05/05/21  1656   WBC 7.1 12.6 16.9*   RBC 3.67* 4.19* 4.37   HGB 11.3* 13.0 13.5   HCT 33.0* 38.2 39.4   PLT 99* 102* 147   GRANS  --   --  76*   LYMPH  --   --  15*   EOS  --   --  0      Cardiac Enzymes Recent Labs     05/05/21  1656      CKND1 1.4      Coagulation No results for input(s): PTP, INR, APTT, INREXT, INREXT in the last 72 hours.     Lipid Panel Lab Results   Component Value Date/Time    Cholesterol, total 128 01/09/2020 08:05 AM    HDL Cholesterol 45 01/09/2020 08:05 AM    LDL, calculated 59.6 01/09/2020 08:05 AM    VLDL, calculated 23.4 01/09/2020 08:05 AM    Triglyceride 117 01/09/2020 08:05 AM    CHOL/HDL Ratio 2.8 01/09/2020 08:05 AM      BNP No results for input(s): BNPP in the last 72 hours.   Liver Enzymes Recent Labs     05/07/21  0140   TP 5.5*   ALB 3.0*   AP 36*      Thyroid Studies No results found for: T4, T3U, TSH, TSHEXT, TSHEXT     Procedures/imaging: see electronic medical records for all procedures/Xrays and details which were not copied into this note but were reviewed prior to creation of Plan    Xr Swallow Func Video    Result Date: 5/6/2021  Modified Barium swallow History: Cough and dysphasia Technique: The patient orally ingested various barium materials under the direction of a speech pathologist with direct fluoroscopic observation. Fluoroscopic time: 1.4 minutes Fluoroscopic dose (reference air kerma): 4.1 mGy The patient ingested thin, pudding, and solid consistencies. No aspiration or penetration was seen. Oropharyngeal phase was unremarkable. 1.  No evidence of penetration or aspiration. Please refer to the separate report and recommendations from the speech pathologist.    Ct Abd Pelv W Cont    Result Date: 5/5/2021  EXAM: CT of the abdomen and pelvis with intravenous contrast. INDICATION:  \"abd pain, fever. \" COMPARISON:  No relevant prior imaging is available for comparison at the time of dictation. DOSE REDUCTION AND DICOM INFORMATION: One or more dose reduction techniques were used on this CT: automated exposure control, adjustment of the mAs and/or kVp according to patient size, and iterative reconstruction techniques. The specific techniques used on this CT exam have been documented in the patient's electronic medical record. Digital Imaging and Communications in Medicine (DICOM) format image data are available to nonaffiliated external healthcare facilities or entities on a secure, media free, reciprocally searchable basis with patient authorization for at least a 12-month period after this study. _______________ FINDINGS:      CHEST BASE: Endovascular aortic valve prosthesis. LAD stent. Low grade multichamber cardiac enlargement.       LIVER: Unremarkable. GALLBLADDER: Unremarkable. BILE DUCTS: Unremarkable. PANCREAS: Unremarkable. SPLEEN: Unremarkable. GASTROINTESTINAL TRACT AND PERITONEAL CAVITY: Cecal resection with ileocolic anastomosis. Marked circumferential bowel wall thickening involving the right colon through the hepatic flexure with surrounding soft tissue stranding. No significant wall thickening in the adjacent neoterminal ileum. Lower esophagus:  Unremarkable. Hiatal hernia:  No.           Diverticulosis:  Marked. Ascites:  No.           Bowel obstruction:  No.           Pneumoperitoneum:  No.      ADRENALS:           Right:  Unremarkable. Left:  Unremarkable. KIDNEYS, URETERS, BLADDER:           Calculi:  None detected. Right:  Small cyst with minimal peripheral calcification does not require specific follow-up. Otherwise unremarkable. Left:  Large simple cyst that does not require specific follow-up. Otherwise unremarkable. Bladder:  Unremarkable. VASCULATURE: Aortoiliac atherosclerosis without AAA. LYMPH NODES: Unremarkable. REPRODUCTIVE ORGANS: Low-grade prostatomegaly. SUPERFICIAL SOFT TISSUES: Right ventral abdominal wall postsurgical muscle atrophy. BONES:  Low-grade scoliosis. Marked hyperlordosis. Advanced chronic disc and facet degeneration. Small lucent lesion in the T11 vertebral body has minimal internal trabeculation, which is suggestive of a benign hemangioma. A 15 mm sclerotic lesion in the T10 vertebral body is nonspecific but statistically most likely benign as an isolated abnormality, likely an enostosis. _______________     Long segment colitis involving the postsurgical right colon. Differential diagnosis includes infectious, inflammatory/autoimmune, and ischemic etiologies, less likely neoplastic/lymphomatous. Correlate with history regarding the circumstances surrounding the surgery.  Follow-up imaging to assess therapeutic response and exclude underlying malignancy is recommended. Chronic ancillary findings, as described. _______________     Yobani Push Chest Port    Result Date: 5/5/2021  EXAM: Portable upright chest radiograph. INDICATION: \"meets SIRS criteria. \" COMPARISON: None. _______________ FINDINGS:    LUNGS:           --Expansion:  Adequate. --Consolidation:  None detected. --Pulmonary edema:  None detected. --Other:  Non-applicable. PLEURAL SPACE:          --Pleural effusion:  None detected. --Pneumothorax:  None detected. _______________     Negative radiographic examination. Duplex Lower Ext Venous Left    Result Date: 5/6/2021  · No evidence of deep vein thrombosis in the left lower extremity.         Kylah Matamoros MD

## 2021-05-07 NOTE — PROGRESS NOTES
Problem: Mobility Impaired (Adult and Pediatric)  Goal: *Acute Goals and Plan of Care (Insert Text)  Description: No goals necessary at this time as pt demonstrates functional mobility with mod I ambulating. Outcome: Resolved/Met  physical Therapy EVALUATION & Discharge    Patient: Eleazar Burger (70 y.o. male)  Date: 5/7/2021  Primary Diagnosis: Colitis [K52.9]  SIRS (systemic inflammatory response syndrome) ([facility]) [R65.10]  Precautions:   Fall  PLOF: amb with SPC  ASSESSMENT AND RECOMMENDATIONS:  Based on the objective data described below, the patient is an 80 yo M seen on medical unit following admission for dx as above. Pt found seated EOB and spouse present in room. Pt presents with L hip slightly weaker than R d/t h/o CVA. Pt demonstrates STS transfers with [facility] with independence (supervision due to IV and additional time if seat is low. Pt able to participate w/GT/SPC 150ft with mod I/S for IV and slow chi. Pt cane adjusted for proper ht (shortened) with pt ed'd as to why adjustment made; noted improvement in chi and step length. Pt returned to room, left up in chair at bedside w/dinner meal in NAD. Nurse Vicky Mcdermott notified of above and that no further skilled PT indicated at this time. Pt advised to call for staff assist when needing to get up for assist with IV. Spouse and pt expressed understanding. Skilled physical therapy is not indicated at this time. Discharge Recommendations: None  Further Equipment Recommendations for Discharge: N/A      SUBJECTIVE:   Patient stated I feel good .     OBJECTIVE DATA SUMMARY:     Past Medical History:   Diagnosis Date    Arthritis     GERD (gastroesophageal reflux disease)     Hypertension     Stroke [facility])      Past Surgical History:   Procedure Laterality Date    HX CHOLECYSTECTOMY      HX HEENT      blind left eye     HX UROLOGICAL      kiney stone    HX VASECTOMY      WI ABDOMEN SURGERY PROC UNLISTED      appendectomy     Barriers to Learning/Limitations: None  Compensate with: visual, verbal, tactile, kinesthetic cues/model  Prior Level of Function/Home Situation: amb with SPC PTA  Home Situation  Home Environment: Private residence  # Steps to Enter: 3  Rails to Enter: Yes  Hand Rails : Bilateral  Wheelchair Ramp: Yes  One/Two Story Residence: Two story  # of Interior Steps: 0(eletric chair)  Lift Chair Available: Yes  Living Alone: No  Support Systems: Spouse/Significant Other/Partner  Patient Expects to be Discharged to[de-identified] Private residence  Current DME Used/Available at Home: Cane, straight, Walker, rolling, Tub transfer bench, Grab bars(tub transfer bench does not fit currently)  Tub or Shower Type: Tub/Shower combination  Critical Behavior:  Neurologic State: Alert; Appropriate for age  Orientation Level: Oriented X4  Cognition: Appropriate for age attention/concentration; Follows commands  Safety/Judgement: Awareness of environment; Fall prevention  Psychosocial  Patient Behaviors: Calm; Cooperative  Family  Behaviors: Calm;Supportive  Purposeful Interaction: Yes  Pt Identified Daily Priority: Clinical issues (comment)  Caritas Process: Nurture loving kindness;Enable efraín/hope;Establish trust;Nurture spiritual self;Teaching/learning;Create healing environment  Caring Interventions: Reassure; Therapeutic modalities  Reassure:  Therapeutic listening  Therapeutic Modalities: Intentional therapeutic touch  Skin Condition/Temp: Warm  Family  Behaviors: Calm;Supportive  Skin Integrity: Intact  Skin Integumentary  Skin Color: Appropriate for ethnicity  Skin Condition/Temp: Warm  Skin Integrity: Intact  Turgor: Non-tenting  Hair Growth: Present  Nails: Within Defined Limits  Strength:    Strength: Generally decreased, functional  Tone & Sensation:   Sensation: Intact(occas numbness hands)  Range Of Motion:  AROM: Generally decreased, functional  Functional Mobility:  Transfers:  Sit to Stand: Independent(additional time if seat is low)  Stand to Sit: Independent  Balance:   Sitting: Intact  Standing: Intact; With support(SPC)  Ambulation/Gait Training:  Distance (ft): 15 Feet (ft)  Assistive Device: Gait belt;Cane, straight  Ambulation - Level of Assistance: Supervision;Modified independent  Gait Description (WDL): Exceptions to WDL  Gait Abnormalities: Decreased step clearance  Speed/Rosy: Pace decreased (<100 feet/min)  Interventions: Verbal cues  Pain:  Pain Scale 1: Numeric (0 - 10)  Pain Intensity 1: 0  Activity Tolerance:   Good   Please refer to the flowsheet for vital signs taken during this treatment. After treatment:   [x]         Patient left in no apparent distress sitting up in chair  []         Patient left in no apparent distress in bed  [x]         Call bell left within reach  [x]         Nursing notified  [x]         Caregiver present  []         Bed alarm activated    COMMUNICATION/EDUCATION:   [x]         Fall prevention education was provided and the patient/caregiver indicated understanding. []         Patient/family have participated as able in goal setting and plan of care. []         Patient/family agree to work toward stated goals and plan of care. []         Patient understands intent and goals of therapy, but is neutral about his/her participation. []         Patient is unable to participate in goal setting and plan of care.     Thank you for this referral.  Kalyn Bella, PT   Time Calculation: 25 mins

## 2021-05-07 NOTE — PROGRESS NOTES
Palliative Medicine    CODE STATUS: FULL CODE with aggressive resuscitative measures          AMD Status: no formal AMD on file. Is  to [place] and has one daughter, Abraham Jane. He is comfortable with Apollo being the primary surrogate decision maker and, if [place] is not available, zelda being the successive surrogate decision maker     Date of Initial Consult: 5/7/2021  Reason for Consult: goals of care decisions/code status  Requesting Provider:  Dr Abner Mcpherson  Primary Care Physician: Dr Tong Lopez   (seen with Hannah Sidhu NP)    PMH: (obtained from medical record and patient) aortic stenosis s/p TAVR; HTN; CVA; CKD    History relative to admission: came to the ED 5/5/2021 for abdominal pain x 4 days. (+) diarrhea. Admitted for IV hydration, IV antibiotics for colitis; SLP eval; and general surgery consult    Consultations/recommendations: General surgery: recommend medical management, signed off. SLP: MBS done--cleared for regular diet    Relevant Diagnostic Information:     Ref. Range 5/5/2021 16:56 5/6/2021 03:30 5/7/2021 01:40 5/7/2021 13:31   WBC Latest Ref Range: 4.6 - 13.2 K/uL 16.9 (H) 12.6 7.1 7.2        Ref. Range 5/5/2021 16:56 5/6/2021 03:30 5/7/2021 01:40   BUN Latest Ref Range: 7.0 - 18 MG/DL 23 (H) 19 (H) 14   Creatinine Latest Ref Range: 0.6 - 1.3 MG/DL 1.39 (H) 1.34 (H) 1.06   BUN/Creatinine ratio Latest Ref Range: 12 - 20   17 14 13     5/5/2021: CT A/P: Long segment colitis involving the postsurgical right colon. Differential diagnosis includes infectious, inflammatory/autoimmune, and ischemic etiologies, less likely neoplastic/lymphomatous. Correlate with history regarding the circumstances surrounding the surgery. 5/7/2021 Seen today in room 327. Awake, alert. Oriented x 4. Respirations unlabored on room air Pain-- abdominal pain much improved from admission. Sitting on side of bed in his own pajamas. Engaged in conversation. Lives with his wife, Apollo.  He has a daughter, Barbara Fallon (from his first marriage) who lives in [place]. Independent in ADLs. Uses cane for ambulation assistance. Introduced the role of palliative medicine for the hospitalized patient. Discussed AMD and that his default surrogate decision maker would be his wife, and if she was not available, daughter , Agustina Downs would be the surrogate. Asked if he had ever spoken with his wife about code status. He said they have had some discussions but had not finalized anything. He stated that he would want resuscitation but would not want to be on life support for a \"long time\". Described the four components of cardiac resuscitation: compressions, medications, electric shucks, and intubation/ventilation. Reviewed benefits and burdens to include fractured ribs, punctured lungs, hypoxic brain injury, and long-term ventilation with need for discussion of tracheostomy or compassionate extubation. Given handout of \"CPR facts\" to review with his wife. Encouraged to have discussion with his family and his PCP. He agreed to same. Disposition plan: anticipate home with family support.      Palliative Medicine remains available for any questions or concerns    Olga Pena RN, MSN  P: [phone]

## 2021-05-07 NOTE — PROGRESS NOTES
2005-alert and oriented x 4. Lungs CTA. BS active x 4 quads. IV access to right forearm infusing NS at 75 ml/hr without difficulty. IV ATB ordered with no adverse effects noted. Pain rated at 2/10 to abdomen. 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 Shift summary- up ad patricia,voiding without difficulty. No complaints of pain or discomfort through the noc.

## 2021-05-07 NOTE — PROGRESS NOTES
Pt is  and ambulates with a cane as needed. Pt also has a shower chair, stair lift and a ramp to enter into the home. Pt is independent with his cane. Pt's wife will assist as needed upon discharge. Anticipate pt will transition home with physician follow up when medically stable. Pt has been cleared by OT with no recommendations. CM awaiting outcome of PT to [facility]. No needs have been identified at this time. Anticipate pt will transition home with physician follow up when medically stable. CM to continue to follow and assist.    Care Management Interventions  Mode of Transport at Discharge:  Other (see comment)(spouse)  Transition of Care Consult (CM Consult): Discharge Planning  Health Maintenance Reviewed: Yes  Speech Therapy Consult: Yes  Current Support Network: Lives with Spouse  Confirm Follow Up Transport: Family  The Plan for Transition of Care is Related to the Following Treatment Goals : Home with physician follow up  Discharge Location  Discharge Placement: Home with family assistance

## 2021-05-07 NOTE — PROGRESS NOTES
Bedside and Verbal shift change report given to [address] (oncoming nurse) by JENIFFER Corbin (offgoing nurse). Report included the following information SBAR, Kardex, Intake/Output, MAR and Recent Results. Shift Summary-   Patient Vitals for the past 12 hrs:   Temp Pulse Resp BP SpO2   05/08/21 0337 98.2 °F (36.8 °C) 66 18 (!) 151/75 99 %   05/07/21 2217 97.8 °F (36.6 °C) 72 16 (!) 149/70 96 %   05/07/21 2011 97.7 °F (36.5 °C) 78 16 134/72 96 %   Pt had uneventful shift. Pt denied pain/discomfort    Bedside and Verbal shift change report given to LU Redmond(oncoming nurse) by [address] (offgoing nurse). Report included the following information SBAR, Kardex, Intake/Output, MAR and Recent Results.

## 2021-05-07 NOTE — ROUTINE PROCESS
Bedside and Verbal shift change report given to JOSE Stewart RN (oncoming nurse) by Mk Khan RN (offgoing nurse). Report included the following information SBAR, Kardex, Intake/Output and MAR.

## 2021-05-07 NOTE — PROGRESS NOTES
OCCUPATIONAL THERAPY EVALUATION/DISCHARGE    Patient: Saranya Lu (18 y.o. male)  Date: 5/7/2021  Primary Diagnosis: Colitis [K52.9]  SIRS (systemic inflammatory response syndrome) (HCC) [R65.10]        Precautions:   Fall  PLOF: mod I for ADLs and transfers     ASSESSMENT AND RECOMMENDATIONS:  Based on the objective data described below, the patient presents to be at his baseline and is independent with ADLs and transfers at this time. Pt presented standing at the sink brushing his teeth independently. Pt managed IV pole independently during transfers and sat up at EOB. Participate with LB dressing independently. Pt was left seated at EOB at the end of session in NAD . Skilled occupational therapy is not indicated at this time. Discharge Recommendations: None  Further Equipment Recommendations for Discharge: N/A      SUBJECTIVE:   Patient stated  I am actually walking much better since the pain is under control.     OBJECTIVE DATA SUMMARY:     Past Medical History:   Diagnosis Date    Arthritis     GERD (gastroesophageal reflux disease)     Hypertension     Stroke [facility])      Past Surgical History:   Procedure Laterality Date    HX CHOLECYSTECTOMY      HX HEENT      blind left eye     HX UROLOGICAL      kiney stone    HX VASECTOMY      [place] ABDOMEN SURGERY PROC UNLISTED      appendectomy     Barriers to Learning/Limitations: None  Compensate with: visual, verbal, tactile, kinesthetic cues/model    Home Situation:   Home Situation  Home Environment: Private residence  # Steps to Enter: 3  Rails to Enter: Yes  Hand Rails : Bilateral  Wheelchair Ramp: Yes  One/Two Story Residence: Two story  # of Interior Steps: 0(eletric chair)  Lift Chair Available: Yes  Living Alone: No  Support Systems: Spouse/Significant Other/Partner  Patient Expects to be Discharged to[de-identified] Private residence  Current DME Used/Available at Home: Cane, straight, Walker, rolling, Tub transfer bench, Grab bars  Tub or Shower Type: Tub/Shower combination  [x]     Right hand dominant   []     Left hand dominant    Cognitive/Behavioral Status:  Neurologic State: Alert  Orientation Level: Oriented X4  Cognition: Appropriate for age attention/concentration; Follows commands  Safety/Judgement: Fall prevention    Skin: intact  Edema: none    Vision/Perceptual:    Tracking: Able to track stimulus in all quadrants w/o difficulty      Coordination: BUE  Coordination: Within functional limits  Fine Motor Skills-Upper: Left Intact; Right Intact    Gross Motor Skills-Upper: Left Intact; Right Intact    Balance:  Sitting: Intact  Standing: Intact    Strength: BUE  Strength: Generally decreased, functional    Tone & Sensation: BUE  Sensation: Intact(occasional numbness at hands )    Range of Motion: BUE  AROM: Generally decreased, functional    Functional Mobility and Transfers for ADLs:  Bed Mobility:    Transfers:  Sit to Stand: Independent  Stand to Sit: Independent   Toilet Transfer : Independent   Bathroom Mobility: Independent    ADL Assessment:  Feeding: Independent    Oral Facial Hygiene/Grooming: Independent    Upper Body Dressing: Independent    Lower Body Dressing: Independent    Toileting: Independent  ADL Intervention:  Grooming  Grooming Assistance: Independent  Position Performed: Standing  Washing Hands: Independent  Brushing Teeth: Independent    Lower Body Dressing Assistance  Dressing Assistance: Independent  Socks: Independent  Leg Crossed Method Used: Yes  Position Performed: Seated edge of bed  Cues: Don;Doff    Toileting  Toileting Assistance: Independent  Bladder Hygiene: Independent  Clothing Management: Independent    Cognitive Retraining  Safety/Judgement: Fall prevention    Therapeutic Exercise:    Pain:  Pain level pre-treatment: 0/10   Pain level post-treatment: 0/10   Pain Intervention(s): Medication (see MAR);  Rest, Ice, Repositioning   Response to intervention: Nurse notified, See doc flow    Activity Tolerance:   Good   Please refer to the flowsheet for vital signs taken during this treatment. After treatment:   [x]  Patient left in no apparent distress sitting up at EOB  []  Patient left in no apparent distress in bed  [x]  Call bell left within reach  [x]  Nursing notified  []  Caregiver present  []  Bed alarm activated    COMMUNICATION/EDUCATION:   [x]      Role of Occupational Therapy in the acute care setting  [x]      Home safety education was provided and the patient/caregiver indicated understanding. [x]      Patient/family have participated as able and agree with findings and recommendations. []      Patient is unable to participate in plan of care at this time. Thank you for this referral.  Yojana Mello, OTR/L  Time Calculation: 15 mins      Eval Complexity: History: LOW Complexity : Brief history review ; Examination: LOW Complexity : 1-3 performance deficits relating to physical, cognitive , or psychosocial skils that result in activity limitations and / or participation restrictions ;    Decision Making:LOW Complexity : No comorbidities that affect functional and no verbal or physical assistance needed to complete eval tasks

## 2021-05-07 NOTE — CONSULTS
Palliative Medicine Consult    Patient Name: Juan Diez  YOB: 1935    Date of Initial Consult: May 7, 2021  Reason for Consult: Goals of care discussions  Requesting Provider: Dr. Gonzales [facility]  Primary Care Physician: Dwain Flood MD      SUMMARY:   Juan Diez is a 81 y. o. with a past history of hypertension, prior stroke, chronic kidney disease stage III, status post TAVR, and GERD who was admitted on 5/5/2021 from home with a diagnosis of colitis and SIRS. Current medical issues leading to Palliative Medicine involvement include: Support and goals of care decisions. PALLIATIVE DIAGNOSES:   1. Goals of care discussions  2. Colitis with SIRS  3. Status post TAVR  4. Advanced age       PLAN:   3. Goals of care discussions: Palliative medicine team including Linnette Herrera RN and I met with patient and patient's bedside. Patient is awake, alert, oriented x4. Introduced our role as palliative medicine. Patient has no AMD on file, is , and has 1 daughter Kulwinder Agee. Patient declines AMD completion, as he states he trusts his wife and daughter Kulwinder Agee to make medical decisions for him in the event he is unable. Patient has a good understanding of his current health situation, and states that his abdominal pain/tenderness has resolved. He is looking forward to hospital discharge soon. Discussed the benefits and burdens of CPR in the event of cardiopulmonary arrest in the setting of advanced age and comorbidities. Patient admits this is not something he likes to think about, but at this time he would want attempts at CPR, but would not want to be on life support prolonged should he not be making any improvements. CPR fact sheet  provided to patient, and encouraged further discussion with family. Patient very appreciative the information, and admits he did not fully understand the potential burdens of CPR. Also encouraged further discussion with PCP.   At this time patient remains a full code with full interventions. 2. Colitis with SIRS: No diarrhea today, on Levaquin and Cipro. Surgery consulted, with recommended medical management. Blood cultures negative. 3. Status post TAVR: Plan for follow-up with Dr. Hamilton Husbands as an outpatient, denies any shortness of breath or chest pain. 4. Advanced age: 71-year-old pleasant gentleman who lives with wife. He is independent in his ADLs, will ambulate with a cane for balance stability. 5. Initial consult note routed to primary continuity provider  6.  Communicated plan of care with: Palliative IDT       GOALS OF CARE / TREATMENT PREFERENCES:   [====Goals of Care====]  GOALS OF CARE: Full code with full interventions  Patient/Health Care Proxy Stated Goals: Prolong life      TREATMENT PREFERENCES:   Code Status: Full Code    Advance Care Planning:  Advance Care Planning 5/5/2021   Confirm Advance Directive None   Patient Would Like to Complete Advance Directive No       Medical Interventions: Full interventions           The palliative care team has discussed with patient / health care proxy about goals of care / treatment preferences for patient.  [====Goals of Care====]         HISTORY:     History obtained from: Patient, chart    CHIEF COMPLAINT: Abdominal pain/tenderness resolved    HPI/SUBJECTIVE:    The patient is:   [x] Verbal and participatory  [] Non-participatory due to:   Awake, alert, oriented x4, engaged in conversation    Clinical Pain Assessment (nonverbal scale for severity on nonverbal patients):   Clinical Pain Assessment  Severity: 0            FUNCTIONAL ASSESSMENT:     Palliative Performance Scale (PPS):  PPS: 70       PSYCHOSOCIAL/SPIRITUAL SCREENING:     Advance Care Planning:  Advance Care Planning 5/5/2021   Confirm Advance Directive None   Patient Would Like to Complete Advance Directive No        Any spiritual / Sikh concerns:  [] Yes /  [x] No    Caregiver Burnout:  [] Yes /  [] No /  [x] No Caregiver Present Anticipatory grief assessment:   [x] Normal  / [] Maladaptive              REVIEW OF SYSTEMS:     Positive and pertinent negative findings in ROS are noted above in HPI. The following systems were [x] reviewed / [] unable to be reviewed as noted in HPI  Other findings are noted below. Systems: constitutional, ears/nose/mouth/throat, respiratory, gastrointestinal, genitourinary, musculoskeletal, integumentary, neurologic, psychiatric, endocrine. Positive findings noted below. Modified ESAS Completed by: provider   Fatigue: 0       Pain: 0(Abdominal pain/tenderness resolved)   Anxiety: 0 Nausea: 0     Dyspnea: 0     Constipation: No              PHYSICAL EXAM:     From RN flowsheet:  Wt Readings from Last 3 Encounters:   05/06/21 82 kg (180 lb 11.2 oz)   07/30/20 64.9 kg (143 lb)   01/09/20 66.3 kg (146 lb 1.6 oz)     Blood pressure 126/70, pulse 84, temperature 98.1 °F (36.7 °C), resp. rate 18, height 5' 2\" (1.575 m), weight 82 kg (180 lb 11.2 oz), SpO2 98 %.     Pain Scale 1: Numeric (0 - 10)  Pain Intensity 1: 0  Pain Onset 1: Sunday  Pain Location 1: Abdomen  Pain Orientation 1: Right, Upper  Pain Description 1: Stabbing  Pain Intervention(s) 1: Therapeutic presence      Constitutional: Awake, alert, sitting on side of bed, appears in no acute distress  Eyes: pupils equal, anicteric  ENMT: no nasal discharge, moist mucous membranes  Cardiovascular:  distal pulses intact  Respiratory: breathing not labored, symmetric  Gastrointestinal: soft non-tender  Musculoskeletal: no deformity  Skin: warm, dry  Neurologic: following commands, moving all extremities, oriented x4  Psychiatric: full affect, no hallucinations         HISTORY:     Principal Problem:    Colitis (5/5/2021)    Active Problems:    SIRS (systemic inflammatory response syndrome) ([facility] [address] (5/5/2021)      HTN (hypertension) (5/6/2021)      CKD (chronic kidney disease) stage 3, GFR 30-59 ml/min ([facility]) (5/6/2021)      Cough (5/6/2021)      CAD (coronary artery disease) (5/7/2021)      S/P TAVR (transcatheter aortic valve replacement) (5/7/2021)      Past Medical History:   Diagnosis Date    Arthritis     GERD (gastroesophageal reflux disease)     Hypertension     Stroke [facility])       Past Surgical History:   Procedure Laterality Date    HX CHOLECYSTECTOMY      HX HEENT      blind left eye     HX UROLOGICAL      kiney stone    HX VASECTOMY      [facility] ABDOMEN SURGERY PROC UNLISTED      appendectomy      Family History   Problem Relation Age of Onset    Asthma Father     Heart Disease Father       History reviewed, no pertinent family history.   Social History     Tobacco Use    Smoking status: Former Smoker    Smokeless tobacco: Never Used   Substance Use Topics    Alcohol use: Not Currently     Allergies   Allergen Reactions    Pcn [Penicillins] Rash      Current Facility-Administered Medications   Medication Dose Route Frequency    metoprolol succinate (TOPROL-XL) XL tablet 50 mg  50 mg Oral BID    atorvastatin (LIPITOR) tablet 20 mg  20 mg Oral QHS    0.9% sodium chloride infusion  75 mL/hr IntraVENous CONTINUOUS    aspirin delayed-release tablet 81 mg  81 mg Oral DAILY    clopidogreL (PLAVIX) tablet 75 mg  75 mg Oral DAILY    doxazosin (CARDURA) tablet 8 mg  8 mg Oral DAILY    vitamin B-P-K-lutein-minerals (OCUVITE) tablet 1 Tab  1 Tab Oral DAILY    famotidine (PF) (PEPCID) 20 mg in 0.9% sodium chloride 10 mL injection  20 mg IntraVENous Q12H    sodium chloride (NS) flush 5-10 mL  5-10 mL IntraVENous PRN    metroNIDAZOLE (FLAGYL) IVPB premix 500 mg  500 mg IntraVENous Q8H    levoFLOXacin (LEVAQUIN) 750 mg in D5W IVPB  750 mg IntraVENous Q24H    oxyCODONE IR (ROXICODONE) tablet 5 mg  5 mg Oral Q4H PRN    morphine injection 1 mg  1 mg IntraVENous Q3H PRN    sodium chloride (NS) flush 5-40 mL  5-40 mL IntraVENous Q8H    sodium chloride (NS) flush 5-40 mL  5-40 mL IntraVENous PRN    acetaminophen (TYLENOL) tablet 650 mg  650 mg Oral Q6H PRN Or    acetaminophen (TYLENOL) suppository 650 mg  650 mg Rectal Q6H PRN    polyethylene glycol (MIRALAX) packet 17 g  17 g Oral DAILY PRN    promethazine (PHENERGAN) tablet 12.5 mg  12.5 mg Oral Q6H PRN    Or    ondansetron (ZOFRAN) injection 4 mg  4 mg IntraVENous Q6H PRN    enoxaparin (LOVENOX) injection 40 mg  40 mg SubCUTAneous DAILY          LAB AND IMAGING FINDINGS:     Lab Results   Component Value Date/Time    WBC 7.2 05/07/2021 01:31 PM    HGB 12.0 (L) 05/07/2021 01:31 PM    PLATELET 032 (L) 88/97/1689 01:31 PM     Lab Results   Component Value Date/Time    Sodium 143 05/07/2021 01:40 AM    Potassium 3.3 (L) 05/07/2021 01:40 AM    Chloride 111 05/07/2021 01:40 AM    CO2 27 05/07/2021 01:40 AM    BUN 14 05/07/2021 01:40 AM    Creatinine 1.06 05/07/2021 01:40 AM    Calcium 7.8 (L) 05/07/2021 01:40 AM    Magnesium 2.3 01/09/2020 08:05 AM      Lab Results   Component Value Date/Time    Alk. phosphatase 36 (L) 05/07/2021 01:40 AM    Protein, total 5.5 (L) 05/07/2021 01:40 AM    Albumin 3.0 (L) 05/07/2021 01:40 AM    Globulin 2.5 05/07/2021 01:40 AM     Lab Results   Component Value Date/Time    INR 1.0 07/30/2020 02:25 PM    Prothrombin time 13.4 07/30/2020 02:25 PM    aPTT 32.3 07/30/2020 02:25 PM      No results found for: IRON, FE, TIBC, IBCT, PSAT, FERR   No results found for: PH, PCO2, PO2  No components found for: [place]   Lab Results   Component Value Date/Time     05/05/2021 04:56 PM    CK - MB 1.9 05/05/2021 04:56 PM                Total time: 50 minutes  Counseling / coordination time, spent as noted above: 45 minutes  > 50% counseling / coordination?:  Yes, patient    Prolonged service was provided for  []30 min   []75 min in face to face time in the presence of the patient, spent as noted above. Time Start:   Time End:   Note: this can only be billed with 70954 (initial) or [id number] (follow up). If multiple start / stop times, list each separately.

## 2021-05-07 NOTE — PROGRESS NOTES
Patient presents compliant to current plan of care as directed.    Estuardo Corbin  5/7/2021  9:46 AM

## 2021-05-07 NOTE — ROUTINE PROCESS
Bedside and Verbal shift change report given to Line [place]ington (oncoming nurse) by Felisa Meigs RN (offgoing nurse). Report included the following information SBAR, Kardex, Intake/Output and MAR.

## 2021-05-08 VITALS
WEIGHT: 183.7 LBS | DIASTOLIC BLOOD PRESSURE: 85 MMHG | HEART RATE: 67 BPM | RESPIRATION RATE: 18 BRPM | BODY MASS INDEX: 33.8 KG/M2 | SYSTOLIC BLOOD PRESSURE: 160 MMHG | OXYGEN SATURATION: 97 % | TEMPERATURE: 97.7 F | HEIGHT: 62 IN

## 2021-05-08 LAB
ALBUMIN SERPL-MCNC: 3.2 G/DL (ref 3.4–5)
ALBUMIN/GLOB SERPL: 1.2 {RATIO} (ref 0.8–1.7)
ALP SERPL-CCNC: 35 U/L (ref 45–117)
ALT SERPL-CCNC: 23 U/L (ref 16–61)
ANION GAP SERPL CALC-SCNC: 7 MMOL/L (ref 3–18)
AST SERPL-CCNC: 15 U/L (ref 10–38)
BILIRUB SERPL-MCNC: 0.3 MG/DL (ref 0.2–1)
BUN SERPL-MCNC: 12 MG/DL (ref 7–18)
BUN/CREAT SERPL: 10 (ref 12–20)
CALCIUM SERPL-MCNC: 8.1 MG/DL (ref 8.5–10.1)
CHLORIDE SERPL-SCNC: 111 MMOL/L (ref 100–111)
CO2 SERPL-SCNC: 26 MMOL/L (ref 21–32)
CREAT SERPL-MCNC: 1.24 MG/DL (ref 0.6–1.3)
ERYTHROCYTE [DISTWIDTH] IN BLOOD BY AUTOMATED COUNT: 12.8 % (ref 11.6–14.5)
GLOBULIN SER CALC-MCNC: 2.6 G/DL (ref 2–4)
GLUCOSE SERPL-MCNC: 104 MG/DL (ref 74–99)
HCT VFR BLD AUTO: 33.8 % (ref 36–48)
HGB BLD-MCNC: 11.6 G/DL (ref 13–16)
MCH RBC QN AUTO: 31 PG (ref 24–34)
MCHC RBC AUTO-ENTMCNC: 34.3 G/DL (ref 31–37)
MCV RBC AUTO: 90.4 FL (ref 74–97)
PLATELET # BLD AUTO: 121 K/UL (ref 135–420)
PMV BLD AUTO: 11.5 FL (ref 9.2–11.8)
POTASSIUM SERPL-SCNC: 3.4 MMOL/L (ref 3.5–5.5)
PROT SERPL-MCNC: 5.8 G/DL (ref 6.4–8.2)
RBC # BLD AUTO: 3.74 M/UL (ref 4.35–5.65)
SODIUM SERPL-SCNC: 144 MMOL/L (ref 136–145)
WBC # BLD AUTO: 7.4 K/UL (ref 4.6–13.2)

## 2021-05-08 PROCEDURE — 36415 COLL VENOUS BLD VENIPUNCTURE: CPT

## 2021-05-08 PROCEDURE — 80053 COMPREHEN METABOLIC PANEL: CPT

## 2021-05-08 PROCEDURE — 74011250636 HC RX REV CODE- 250/636: Performed by: PHYSICIAN ASSISTANT

## 2021-05-08 PROCEDURE — 74011250636 HC RX REV CODE- 250/636: Performed by: FAMILY MEDICINE

## 2021-05-08 PROCEDURE — 85027 COMPLETE CBC AUTOMATED: CPT

## 2021-05-08 PROCEDURE — 74011250637 HC RX REV CODE- 250/637: Performed by: FAMILY MEDICINE

## 2021-05-08 PROCEDURE — 74011000250 HC RX REV CODE- 250: Performed by: FAMILY MEDICINE

## 2021-05-08 PROCEDURE — 74011250637 HC RX REV CODE- 250/637: Performed by: HOSPITALIST

## 2021-05-08 RX ORDER — METRONIDAZOLE 500 MG/1
500 TABLET ORAL 3 TIMES DAILY
Qty: 21 TAB | Refills: 0 | Status: SHIPPED | OUTPATIENT
Start: 2021-05-08 | End: 2021-05-15

## 2021-05-08 RX ORDER — CIPROFLOXACIN 500 MG/1
500 TABLET ORAL 2 TIMES DAILY
Qty: 14 TAB | Refills: 0 | Status: SHIPPED | OUTPATIENT
Start: 2021-05-08 | End: 2021-05-15

## 2021-05-08 RX ORDER — LEVOFLOXACIN 5 MG/ML
750 INJECTION, SOLUTION INTRAVENOUS
Status: DISCONTINUED | OUTPATIENT
Start: 2021-05-09 | End: 2021-05-08 | Stop reason: HOSPADM

## 2021-05-08 RX ORDER — POTASSIUM CHLORIDE 20 MEQ/1
40 TABLET, EXTENDED RELEASE ORAL
Status: COMPLETED | OUTPATIENT
Start: 2021-05-08 | End: 2021-05-08

## 2021-05-08 RX ADMIN — CLOPIDOGREL BISULFATE 75 MG: 75 TABLET ORAL at 09:09

## 2021-05-08 RX ADMIN — POTASSIUM CHLORIDE 40 MEQ: 1500 TABLET, EXTENDED RELEASE ORAL at 10:31

## 2021-05-08 RX ADMIN — FAMOTIDINE 20 MG: 10 INJECTION INTRAVENOUS at 09:09

## 2021-05-08 RX ADMIN — Medication 81 MG: at 09:09

## 2021-05-08 RX ADMIN — Medication 1 TABLET: at 09:10

## 2021-05-08 RX ADMIN — METRONIDAZOLE 500 MG: 500 INJECTION, SOLUTION INTRAVENOUS at 02:42

## 2021-05-08 RX ADMIN — ENOXAPARIN SODIUM 40 MG: 40 INJECTION SUBCUTANEOUS at 09:07

## 2021-05-08 RX ADMIN — DOXAZOSIN 8 MG: 4 TABLET ORAL at 09:08

## 2021-05-08 RX ADMIN — Medication 10 ML: at 06:14

## 2021-05-08 RX ADMIN — METOPROLOL SUCCINATE 50 MG: 50 TABLET, EXTENDED RELEASE ORAL at 09:08

## 2021-05-08 NOTE — PROGRESS NOTES
0715  Bedside and verbal shift change report given to Pipe Patel RN (on coming nurse) by COLT Eng RN (off going nurse). Report included the following information SBAR, Kardex, OR Summary, Intake/Output and MAR.    1140  AVS review with pt, opportunity for questions and concerns at this time. D/c IV clean and dry. Properly discarded armbands.

## 2021-05-08 NOTE — PROGRESS NOTES
Problem: Falls - Risk of  Goal: *Absence of Falls  Description: Document Roge Cook Fall Risk and appropriate interventions in the flowsheet.   Outcome: Progressing Towards Goal  Note: Fall Risk Interventions:  Mobility Interventions: Assess mobility with egress test    Mentation Interventions: Adequate sleep, hydration, pain control    Medication Interventions: Teach patient to arise slowly

## 2021-05-08 NOTE — DISCHARGE SUMMARY
Discharge Summary    Patient: Migdalia Victoria MRN: [id number]  CSN: [id number]    YOB: 1935  Age: 81 y.o. Sex: male    DOA: 5/5/2021 LOS:  LOS: 3 days   Discharge Date:      Primary Care Provider:  Jeni Mckeon MD    Admission Diagnoses: Colitis [K52.9]  SIRS (systemic inflammatory response syndrome) ([facility] [R65.10]    Discharge Diagnoses:    Hospital Problems  Date Reviewed: 5/6/2021          Codes Class Noted POA    CAD (coronary artery disease) ICD-10-CM: I25.10  ICD-9-CM: 414.00  5/7/2021 Unknown        S/P TAVR (transcatheter aortic valve replacement) ICD-10-CM: Z95.2  ICD-9-CM: V43.3  5/7/2021 Unknown        HTN (hypertension) ICD-10-CM: I10  ICD-9-CM: 401.9  5/6/2021 Yes        CKD (chronic kidney disease) stage 3, GFR 30-59 ml/min ([facility] ICD-10-CM: N18.30  ICD-9-CM: 585.3  5/6/2021 Yes        Cough ICD-10-CM: R05  ICD-9-CM: 786.2  5/6/2021 Yes        * (Principal) Colitis ICD-10-CM: K52.9  ICD-9-CM: 558.9  5/5/2021 Yes        SIRS (systemic inflammatory response syndrome) ([facility] ICD-10-CM: R65.10  ICD-9-CM: 995.90  5/5/2021 Yes              Discharge Condition: stable     Discharge Medications:     Current Discharge Medication List      START taking these medications    Details   ciprofloxacin HCl (Cipro) 500 mg tablet Take 1 Tab by mouth two (2) times a day for 7 days. Qty: 14 Tab, Refills: 0  Start date: 5/8/2021, End date: 5/15/2021      metroNIDAZOLE (FlagyL) 500 mg tablet Take 1 Tab by mouth three (3) times daily for 7 days. Qty: 21 Tab, Refills: 0  Start date: 5/8/2021, End date: 5/15/2021         CONTINUE these medications which have NOT CHANGED    Details   clopidogreL (Plavix) 75 mg tab Take 75 mg by mouth daily. metoprolol succinate (TOPROL-XL) 50 mg XL tablet Take 50 mg by mouth two (2) times a day. amLODIPine (NORVASC) 5 mg tablet Take 5 mg by mouth daily. magnesium oxide (MAG-OX) 400 mg tablet Take 400 mg by mouth daily.       potassium chloride SR (KLOR-CON 10) 10 mEq tablet Take  by mouth.      peg 400-propylene glycol (SYSTANE, PROPYLENE GLYCOL,) 0.4-0.3 % drop as needed. aspirin delayed-release 81 mg tablet Take 81 mg by mouth daily. doxazosin (CARDURA) 8 mg tablet Take 8 mg by mouth daily. simvastatin (ZOCOR) 40 mg tablet Take 80 mg by mouth nightly. vit A/C/E ac/ZnOx/cupric oxide (EYE VITAMIN AND MINERALS PO) Take  by mouth two (2) times a day. Procedures : none     Consults: surgery       PHYSICAL EXAM   Visit Vitals  BP (!) 151/75   Pulse 66   Temp 98.2 °F (36.8 °C)   Resp 18   Ht 5' 2\" (1.575 m)   Wt 83.3 kg (183 lb 11.2 oz)   SpO2 99%   BMI 33.60 kg/m²     General: Awake, cooperative, no acute distress    HEENT: NC, Atraumatic. PERRLA, EOMI. Anicteric sclerae. Lungs:  CTA Bilaterally. No Wheezing/Rhonchi/Rales. Heart:  Regular  rhythm,  No murmur, No Rubs, No Gallops  Abdomen: Soft, Non distended, Non tender. +Bowel sounds,   Extremities: No c/c/e  Psych:   Not anxious or agitated. Neurologic:  No acute neurological deficits. Admission HPI :  Migdalia Victoria is a 81 y.o. male with hypertension, prior stroke, and CKD stage III presents to the ED with right sided abdominal pain, fever, and weakness for the past 3 days. He has had nonbloody diarrhea for the past 2 days but has not had any nausea or vomiting. He had a prior appendectomy. Of note, he also states that he has a cough when he drinks liquids but does not have any trouble with solid food. He does not have any pain with swallowing and denies any weight loss    Hospital Course :   Migdalia Victoria is a 81 y.o. male with hypertension, prior stroke, and CKD stage III was admitted to due to colitis. He received IV hydration, Levaquin and Flagyl were started. General surgery was consulted and recommended to continue medical treatment.   With the treatment, his renal function was back to normal range, potassium was replaced. His diarrhea resolved. He has no abdominal pain on discharge and tolerated diet very well. He remained hemodynamic stable during his stay. Blood culture was no growth for 3 days. Discharge planning discussed with patient, pt agrees  with the plan and no questions and concerns at this point. Activity: Activity as tolerated    Diet: Cardiac Diet     Follow-up: PCP     Disposition: home     Minutes spent on discharge: 45 min       Labs: Results:       Chemistry Recent Labs     05/08/21  0504 05/07/21  0140 05/06/21  0330   * 93 93    143 142   K 3.4* 3.3* 3.9    111 107   CO2 26 27 29   BUN 12 14 19*   CREA 1.24 1.06 1.34*   CA 8.1* 7.8* 8.5   AGAP 7 5 6   BUCR 10* 13 14   AP 35* 36* 40*   TP 5.8* 5.5* 6.0*   ALB 3.2* 3.0* 3.2*   GLOB 2.6 2.5 2.8   AGRAT 1.2 1.2 1.1      CBC w/Diff Recent Labs     05/08/21  0504 05/07/21  1331 05/07/21  0140 05/05/21  1656 05/05/21  1656   WBC 7.4 7.2 7.1   < > 16.9*   RBC 3.74* 3.86* 3.67*   < > 4.37   HGB 11.6* 12.0* 11.3*   < > 13.5   HCT 33.8* 34.6* 33.0*   < > 39.4   * 116* 99*   < > 147   GRANS  --  68  --   --  76*   LYMPH  --  22  --   --  15*   EOS  --  2  --   --  0    < > = values in this interval not displayed. Cardiac Enzymes Recent Labs     05/05/21  1656      CKND1 1.4      Coagulation No results for input(s): PTP, INR, APTT, INREXT in the last 72 hours. Lipid Panel Lab Results   Component Value Date/Time    Cholesterol, total 128 01/09/2020 08:05 AM    HDL Cholesterol 45 01/09/2020 08:05 AM    LDL, calculated 59.6 01/09/2020 08:05 AM    VLDL, calculated 23.4 01/09/2020 08:05 AM    Triglyceride 117 01/09/2020 08:05 AM    CHOL/HDL Ratio 2.8 01/09/2020 08:05 AM      BNP No results for input(s): BNPP in the last 72 hours.    Liver Enzymes Recent Labs     05/08/21  0504   TP 5.8*   ALB 3.2*   AP 35*      Thyroid Studies No results found for: T4, T3U, TSH, TSHEXT       @micro    Significant Diagnostic Studies: Xr Swallow [facility] Video    Result Date: 5/6/2021  Modified Barium swallow History: Cough and dysphasia Technique: The patient orally ingested various barium materials under the direction of a speech pathologist with direct fluoroscopic observation. Fluoroscopic time: 1.4 minutes Fluoroscopic dose (reference air kerma): 4.1 mGy The patient ingested thin, pudding, and solid consistencies. No aspiration or penetration was seen. Oropharyngeal phase was unremarkable. 1.  No evidence of penetration or aspiration. Please refer to the separate report and recommendations from the speech pathologist.    Ct Abd Pelv W Cont    Result Date: 5/5/2021  EXAM: CT of the abdomen and pelvis with intravenous contrast. INDICATION:  \"abd pain, fever. \" COMPARISON:  No relevant prior imaging is available for comparison at the time of dictation. DOSE REDUCTION AND DICOM INFORMATION: One or more dose reduction techniques were used on this CT: automated exposure control, adjustment of the mAs and/or kVp according to patient size, and iterative reconstruction techniques. The specific techniques used on this CT exam have been documented in the patient's electronic medical record. Digital Imaging and Communications in Medicine (DICOM) format image data are available to nonaffiliated external healthcare facilities or entities on a secure, media free, reciprocally searchable basis with patient authorization for at least a 12-month period after this study. _______________ FINDINGS:      CHEST BASE: Endovascular aortic valve prosthesis. LAD stent. Low grade multichamber cardiac enlargement. LIVER: Unremarkable. GALLBLADDER: Unremarkable. BILE DUCTS: Unremarkable. PANCREAS: Unremarkable. SPLEEN: Unremarkable. GASTROINTESTINAL TRACT AND PERITONEAL CAVITY: Cecal resection with ileocolic anastomosis.  Marked circumferential bowel wall thickening involving the right colon through the hepatic flexure with surrounding soft tissue stranding. No significant wall thickening in the adjacent neoterminal ileum. Lower esophagus:  Unremarkable. Hiatal hernia:  No.           Diverticulosis:  Marked. Ascites:  No.           Bowel obstruction:  No.           Pneumoperitoneum:  No.      ADRENALS:           Right:  Unremarkable. Left:  Unremarkable. KIDNEYS, URETERS, BLADDER:           Calculi:  None detected. Right:  Small cyst with minimal peripheral calcification does not require specific follow-up. Otherwise unremarkable. Left:  Large simple cyst that does not require specific follow-up. Otherwise unremarkable. Bladder:  Unremarkable. VASCULATURE: Aortoiliac atherosclerosis without AAA. LYMPH NODES: Unremarkable. REPRODUCTIVE ORGANS: Low-grade prostatomegaly. SUPERFICIAL SOFT TISSUES: Right ventral abdominal wall postsurgical muscle atrophy. BONES:  Low-grade scoliosis. Marked hyperlordosis. Advanced chronic disc and facet degeneration. Small lucent lesion in the T11 vertebral body has minimal internal trabeculation, which is suggestive of a benign hemangioma. A 15 mm sclerotic lesion in the T10 vertebral body is nonspecific but statistically most likely benign as an isolated abnormality, likely an enostosis. _______________     Long segment colitis involving the postsurgical right colon. Differential diagnosis includes infectious, inflammatory/autoimmune, and ischemic etiologies, less likely neoplastic/lymphomatous. Correlate with history regarding the circumstances surrounding the surgery. Follow-up imaging to assess therapeutic response and exclude underlying malignancy is recommended. Chronic ancillary findings, as described. _______________     Mikejoanie Sheppard Chest Port    Result Date: 5/5/2021  EXAM: Portable upright chest radiograph. INDICATION: \"meets SIRS criteria. \" COMPARISON: None. _______________ FINDINGS:    LUNGS:           --Expansion: Adequate. --Consolidation:  None detected. --Pulmonary edema:  None detected. --Other:  Non-applicable. PLEURAL SPACE:          --Pleural effusion:  None detected. --Pneumothorax:  None detected. _______________     Negative radiographic examination. _______________     Duplex Lower Ext Venous Left    Result Date: 5/6/2021  · No evidence of deep vein thrombosis in the left lower extremity.               Malena Caballero [facility]     CC: Cristy Greer MD

## 2021-05-08 NOTE — PROGRESS NOTES
Pharmacy Dosing Services: The following medication: Pepcid was automatically dose-adjusted per THE [facility] P&T Committee Protocol, with respect to renal function. Pt Weight:   Wt Readings from Last 1 Encounters:   05/07/21 83.3 kg (183 lb 11.2 oz)         Previous Regimen Pepcid 20 mg IV q 12 hr   Serum Creatinine Lab Results   Component Value Date/Time    Creatinine 1.24 05/08/2021 05:04 AM       Creatinine Clearance Estimated Creatinine Clearance: 41.5 mL/min (based on SCr of 1.24 mg/dL). BUN Lab Results   Component Value Date/Time    BUN 12 05/08/2021 05:04 AM       Dosage changed to:  Pepcid 20 mg IV q 24 hrs    Additional notes:      Pharmacy to continue to monitor patient daily. Will make dosage adjustments based upon changing renal function. Signed Bhumi Huynh RPH.  Contact information:   [phone]

## 2021-05-08 NOTE — PROGRESS NOTES
Pharmacy Dosing Services:     Pharmacist Renal Dosing Progress Note for Levaquin     The following medication: levaquin was automatically dose-adjusted per THE [facility] P&T Committee Protocol, with respect to renal function. Pt Weight:   Wt Readings from Last 1 Encounters:   05/07/21 83.3 kg (183 lb 11.2 oz)       Previous Regimen Levaquin 750 mg q 24 hr   Serum Creatinine Lab Results   Component Value Date/Time    Creatinine 1.24 05/08/2021 05:04 AM       Creatinine Clearance Estimated Creatinine Clearance: 41.5 mL/min (based on SCr of 1.24 mg/dL). BUN Lab Results   Component Value Date/Time    BUN 12 05/08/2021 05:04 AM       Dosage changed to:  Levaquin 750 mg q 48 hr    Additional notes:    Pharmacy to continue to monitor patient daily. Will make dosage adjustments based upon changing renal function. Signed Osvaldo Angel RPH.  Contact information:   [phone]

## 2021-05-11 LAB
BACTERIA SPEC CULT: NORMAL
BACTERIA SPEC CULT: NORMAL
SERVICE CMNT-IMP: NORMAL
SERVICE CMNT-IMP: NORMAL

## 2022-03-18 PROBLEM — R05.9 COUGH: Status: ACTIVE | Noted: 2021-05-06

## 2022-03-18 PROBLEM — K52.9 COLITIS: Status: ACTIVE | Noted: 2021-05-05

## 2022-03-18 PROBLEM — Z95.2 S/P TAVR (TRANSCATHETER AORTIC VALVE REPLACEMENT): Status: ACTIVE | Noted: 2021-05-07

## 2022-03-19 PROBLEM — N18.30 CKD (CHRONIC KIDNEY DISEASE) STAGE 3, GFR 30-59 ML/MIN (HCC): Status: ACTIVE | Noted: 2021-05-06

## 2022-03-19 PROBLEM — I10 HTN (HYPERTENSION): Status: ACTIVE | Noted: 2021-05-06

## 2022-03-19 PROBLEM — R65.10 SIRS (SYSTEMIC INFLAMMATORY RESPONSE SYNDROME) (HCC): Status: ACTIVE | Noted: 2021-05-05

## 2022-03-20 PROBLEM — I25.10 CAD (CORONARY ARTERY DISEASE): Status: ACTIVE | Noted: 2021-05-07

## 2022-10-17 NOTE — Clinical Note
Catheter removed.   Unable to cannulate Hpi Title: Evaluation of Skin Lesions How Severe Are Your Spot(S)?: mild

## 2022-12-18 ENCOUNTER — HOSPITAL ENCOUNTER (EMERGENCY)
Age: 87
Discharge: HOME | End: 2022-12-18
Attending: EMERGENCY MEDICINE
Payer: MEDICARE

## 2022-12-18 ENCOUNTER — APPOINTMENT (OUTPATIENT)
Dept: GENERAL RADIOLOGY | Age: 87
End: 2022-12-18
Attending: PHYSICIAN ASSISTANT
Payer: MEDICARE

## 2022-12-18 VITALS
OXYGEN SATURATION: 96 % | WEIGHT: 145 LBS | HEART RATE: 70 BPM | RESPIRATION RATE: 18 BRPM | SYSTOLIC BLOOD PRESSURE: 134 MMHG | HEIGHT: 62 IN | TEMPERATURE: 97.3 F | DIASTOLIC BLOOD PRESSURE: 90 MMHG | BODY MASS INDEX: 26.68 KG/M2

## 2022-12-18 DIAGNOSIS — J11.1 INFLUENZA-LIKE ILLNESS: Primary | ICD-10-CM

## 2022-12-18 LAB
FLUAV RNA SPEC QL NAA+PROBE: NOT DETECTED
FLUBV RNA SPEC QL NAA+PROBE: NOT DETECTED
SARS-COV-2, COV2: NOT DETECTED

## 2022-12-18 PROCEDURE — 71046 X-RAY EXAM CHEST 2 VIEWS: CPT

## 2022-12-18 PROCEDURE — 99284 EMERGENCY DEPT VISIT MOD MDM: CPT

## 2022-12-18 PROCEDURE — 87636 SARSCOV2 & INF A&B AMP PRB: CPT

## 2022-12-18 NOTE — ED PROVIDER NOTES
EMERGENCY DEPARTMENT HISTORY & PHYSICAL EXAM    THE [facility] EMERGENCY DEPT  12/18/2022, 2:04 PM    Clinical Impression:  1. Influenza-like illness        Assessment/Differential Diagnosis:     Ddx infection, bacterial infection, COVID, flu, pneumonia, TB, PE all considered    ED Course:   Initial assessment performed. The patients presenting problems have been discussed, and they are in agreement with the care plan formulated and outlined with them. I have encouraged them to ask questions as they arise throughout their visit. Pt here with day 2 of myalgia, fatigue, feverish, rhinorrhea, and cough productive of yellow phlegm with small amount of blood streaking. No SOB, CP, neck stiffness, abd complaints. Taking plavix, and baby ASA daily. No other areas of bleeding. Exam with pt appearing well. Nontoxic. Vitals reassuring.  + rhinorrhea, occasional dry cough. Will check for covid/flu, CXR  CXR no acute findings  Given sudden onset viral symptoms, minimal streaking of blood with phlegm, and normal VS, low concern for PE  Symptomatic care, return precautions discussed        Medical Chart Review:  I have reviewed triage nursing documentation. Review of old medical records with the following pertinent information:       Disposition:  Home  in good condition. Chief Complaint   Patient presents with    Cough    Generalized Body Aches    Hemoptysis     HPI:    The history is provided by patient and wife. No  used. Glo Garcia is a 81 y.o. male presenting to the Emergency Department with complaints of fatigue, myalgia, cough, slight rhinorrhea and occasional phlegm, yellow in color with a streak of blood noticed today. Patient has felt warm but no documented fever. No chills, rash, neck stiffness, shortness of breath or chest pain. No abdominal symptoms.   No other areas of bleeding specifically no bleeding from the nose, gums, excessive bruising, blood in his urine or black or bloody stools. Patient states he was feeling well until yesterday when his symptoms started. Patient does take Plavix and baby aspirin daily  Patient does have history of hypertension, GERD, aortic valve replacement with subsequent stroke after surgery. No surgeries in the past year. He is not diabetic      I have reviewed all PMHX, FMHX and Social Hx as entered into the medical record in the chart below using the Epic Template. Review of Systems:  Constitutional:+ for feverish, no chills. ENT:  positive for sore throat, positive for rhinorrhea, negative for ear pain  Respiratory:  positive for cough, no shortness of breath  Cardiovascular:  neg for chest pain  GI:  neg for abdominal pain. No n/v/d.  :  No dysuria, hematuria. No Flank pain. MSK: negative for arthralgias, positive for myalgias  Integumentary: no rashes, or skin trauma  Neurological: neg for headaches  All other systems reviewed negative with exception of positives in ROS and HPI. Past Medical History:  Past Medical History:   Diagnosis Date    Arthritis     GERD (gastroesophageal reflux disease)     Hypertension     Stroke [facility])        Past Surgical History:  Past Surgical History:   Procedure Laterality Date    HX CHOLECYSTECTOMY      HX HEENT      blind left eye     HX UROLOGICAL      kiney stone    HX VASECTOMY      IA ABDOMEN SURGERY PROC UNLISTED      appendectomy       Family History:  Family History   Problem Relation Age of Onset    Asthma Father     Heart Disease Father        Social History:  Social History     Tobacco Use    Smoking status: Former    Smokeless tobacco: Never   Substance Use Topics    Alcohol use: Not Currently       Allergies:   Allergies   Allergen Reactions    Pcn [Penicillins] Rash       Vital Signs:  Vitals:    12/18/22 1225   BP: (!) 134/90   Pulse: 70   Resp: 18   Temp: 97.3 °F (36.3 °C)   SpO2: 96%   Weight: 65.8 kg (145 lb)   Height: 5' 1.5\" (1.562 m)     Physical Exam:  Vital Signs Reviewed. Nursing Notes Reviewed. Constitutional:  Well developed, well nourished patient. Appearance and behavior are age and situation appropriate. Ambulating normally. Nontoxic appearing. Head: Normocephalic, Atraumatic . No facial swelling . No rash. No pain over sinuses with percussion. Eyes: Visual acuity grossly normal by my exam. Conjunctiva clear,Sclera anicteric. PERRLA. Eyelids normal   ENT:hearing grossly intact, Canals normal, TMs normal. Nose patent, normal septum, clear congestion present. Throat clear. No lesions of oral mucosa. Neck:  supple, FROM , no nuchal rigidity. No adenopathy. No swelling   Lungs: No respiratory distress. Lungs CTAB . No cough on exam.  CV:  RR&R without murmur. Neuro:  A&O, no obvious neuro deficit. Skin:  Warm, dry, no rash. Diagnostics:    Labs -     Recent Results (from the past 12 hour(s))   COVID-19 WITH INFLUENZA A/B    Collection Time: 12/18/22  2:21 PM   Result Value Ref Range    SARS-CoV-2 by PCR Not detected NOTD      Influenza A by PCR Not detected NOTD      Influenza B by PCR Not detected NOTD         Radiologic Studies -   XR CHEST PA LAT   Final Result      1. No acute cardiopulmonary process. CT Results  (Last 48 hours)      None          CXR Results  (Last 48 hours)                 12/18/22 1425  XR CHEST PA LAT Final result    Impression:      1. No acute cardiopulmonary process. Narrative:  EXAM: XR CHEST PA LAT       CLINICAL INDICATION/HISTORY: cough, hemoptysis   -Additional: None       COMPARISON: 5/5/2021       TECHNIQUE: Frontal and lateral views of the chest       _______________       FINDINGS:       HEART AND MEDIASTINUM: Midline cardiac silhouette, normal in size. Previous   aortic valve repair. Stable tortuous aorta. Unremarkable hilar structures       LUNGS AND PLEURAL SPACES: No focal consolidation, parenchymal opacity. No   pneumothorax or pleural effusion.        BONY THORAX AND SOFT TISSUES: No acute or destructive osseous abnormality. _______________                   Medications given in the ED-  Medications - No data to display    Please note that this dictation was completed with [email or web address], the computer voice recognition software. Quite often unanticipated grammatical, syntax, homophones, and other interpretive errors are inadvertently transcribed by the computer software. Please disregard these errors. Please excuse any errors that have escaped final proofreading.

## 2022-12-18 NOTE — DISCHARGE INSTRUCTIONS
Your symptoms today are suggestive of a viral illness  Your COVID and flu test were negative today  Your chest x-ray showed no sign of pneumonia or acute findings    Symptom care with Tylenol, rest, stay well-hydrated and get plenty of rest  Contagious precautions  It is important you follow-up with your doctor in the next 1 to 2 days for reevaluation  Return to ER if you develop any new or worsening symptoms, shortness of breath, chest pain or new concerns

## 2022-12-18 NOTE — ED TRIAGE NOTES
Pt report cough, generalized body aches, and Hemoptysis. Pt report being on blood thinner, Plavix 75 mg daily. Symptoms started yesterday, got worst last night.  HX josue, cardiac surgery

## 2023-08-27 NOTE — ED PROVIDER NOTES
EMERGENCY DEPARTMENT HISTORY AND PHYSICAL EXAM    Date: 7/30/2020  Patient Name: Jessy Caldwell    History of Presenting Illness     Chief Complaint   Patient presents with    Fall         History Provided By: Patient    Additional History (Context):   Jessy Caldwell is a 81 y.o. male with PMHX hypertension presents to the emergency department after he lost his balance while holding tools working on a staircase. States that he fell down 10 steps. Reports hitting the back of his head but denies any loss of consciousness. Is reporting left wrist pain and low back pain. . Pt denies chest pain, shortness of breath, abdominal pain, nausea or vomiting, and any other sxs or complaints. PCP: Kathy Calzada MD    Current Facility-Administered Medications   Medication Dose Route Frequency Provider Last Rate Last Dose    levETIRAcetam in saline (iso-os) (KEPPRA) infusion 1,000 mg  1,000 mg IntraVENous NOW Agustiady-Desir, Noni Miquel, DO        niCARdipine in Saline (CARDENE) 0.1mg/mL 200 ml premix infusion  5-15 mg/hr IntraVENous TITRATE Agustiady-Desir, Noni Miquel, DO         Current Outpatient Medications   Medication Sig Dispense Refill    peg 400-propylene glycol (SYSTANE, PROPYLENE GLYCOL,) 0.4-0.3 % drop as needed.  vit A/C/E ac/ZnOx/cupric oxide (EYE VITAMIN AND MINERALS PO) Take  by mouth two (2) times a day.  aspirin delayed-release 81 mg tablet Take 81 mg by mouth daily.  atenolol (TENORMIN) 100 mg tablet Take 100 mg by mouth daily.  benzonatate (TESSALON) 100 mg capsule Take 100 mg by mouth three (3) times daily as needed for Cough.  doxazosin (CARDURA) 8 mg tablet Take 8 mg by mouth daily.  hydroCHLOROthiazide (HYDRODIURIL) 25 mg tablet Take 25 mg by mouth daily.  simvastatin (ZOCOR) 40 mg tablet Take  by mouth nightly.          Past History     Past Medical History:  Past Medical History:   Diagnosis Date    Arthritis     GERD (gastroesophageal reflux     Discontinue cefazolin   Already on piperacillin-tazobactam     Full consult to follow  disease)     Hypertension        Past Surgical History:  Past Surgical History:   Procedure Laterality Date    ABDOMEN SURGERY PROC UNLISTED      appendectomy    HX CHOLECYSTECTOMY      HX HEENT      blind left eye     HX UROLOGICAL      kiney stone    HX VASECTOMY         Family History:  Family History   Problem Relation Age of Onset    Asthma Father        Social History:  Social History     Tobacco Use    Smoking status: Former Smoker    Smokeless tobacco: Never Used   Substance Use Topics    Alcohol use: Not Currently    Drug use: Not on file       Allergies: Allergies   Allergen Reactions    Pcn [Penicillins] Rash         Review of Systems   Review of Systems   Constitutional: Negative for chills and fever. HENT: Negative for congestion, ear pain, sinus pain and sore throat. Eyes: Negative for pain and visual disturbance. Respiratory: Negative for cough and shortness of breath. Cardiovascular: Negative for chest pain and leg swelling. Gastrointestinal: Negative for abdominal pain, constipation, diarrhea, nausea and vomiting. Genitourinary: Negative for dysuria and hematuria. Musculoskeletal: Positive for arthralgias, back pain and joint swelling. Negative for neck pain. Skin: Negative for pallor and rash. Neurological: Positive for headaches. Negative for dizziness, tremors, weakness and light-headedness. All other systems reviewed and are negative. Physical Exam     Vitals:    07/30/20 1212   BP: 176/89   Pulse: 74   Resp: 16   Temp: 98.7 °F (37.1 °C)   SpO2: 100%   Weight: 64.9 kg (143 lb)   Height: 5' 2\" (1.575 m)     Physical Exam    Nursing note and vitals reviewed    Constitutional: Elderly  male, no acute distress  Head: Small quarter size hematoma to the left parieto-occipital region. ,  No open lacerations or abrasions.   Eyes: Pupils are equal, round, and reactive to light, EOMI  Neck: Supple, non-tender  Cardiovascular: Regular rate and rhythm, no murmurs, rubs, or gallops, + 2 radial pulses bilaterally  Chest: Normal work of breathing and chest excursion bilaterally  Lungs: Clear to ausculation bilaterally, no wheezes, no rhonchi  Abdomen: Soft, non tender, non distended, normoactive bowel sounds  Back: No evidence of trauma or deformity, no midline tenderness, no step-offs or misalignment  Extremities: Left wrist swelling with overlying ecchymoses. Tenderness over palpation. +2 radial pulse. Skin: Warm and dry, normal cap refill  Neuro: Alert and appropriate, CN intact, normal speech, moving all 4 extremities freely and symmetrically  Psychiatric: Normal mood and affect       Diagnostic Study Results     Labs -   No results found for this or any previous visit (from the past 12 hour(s)). Radiologic Studies -   CT HEAD WO CONT   Final Result   IMPRESSION:         1. Acute left-sided subdural hematoma along the left cerebral convexity and   medial aspects of the left tentorial leaflet.      > Estimated thickness is between 2 to 3 mm in size.      > No significant mass effect or midline shift demonstrated. 2. No acute intra-axial hemorrhage. 3. Subcortical and periventricular white matter low-attenuation in keeping with   sequela of chronic ischemic microvascular change. CRITICAL RESULT:  Findings of acute subdural hematoma called to Dr. Gianni Messer in   the emergency room prior to dictation at 1:45 PM on 7/30/2020. CT SPINE LUMB WO CONT   Final Result   IMPRESSION:      No acute fracture or subluxation of the lumbar spine. Multilevel lumbar   spondylosis including moderate to high-grade foraminal encroachment and may   represent source of impingement, suboptimally assessed by CT. XR HAND LT MIN 3 V   Final Result   IMPRESSION:         1. Potential dorsal triquetral avulsion fracture with significant dorsal left   wrist soft tissue swelling. 2. Degenerative changes, greatest involving the basal joint of the thumb.         CT Results (Last 48 hours)               07/30/20 1334  CT HEAD WO CONT Final result    Impression:  IMPRESSION:           1. Acute left-sided subdural hematoma along the left cerebral convexity and   medial aspects of the left tentorial leaflet.      > Estimated thickness is between 2 to 3 mm in size.      > No significant mass effect or midline shift demonstrated. 2. No acute intra-axial hemorrhage. 3. Subcortical and periventricular white matter low-attenuation in keeping with   sequela of chronic ischemic microvascular change. CRITICAL RESULT:  Findings of acute subdural hematoma called to Dr. Miky Benitez in   the emergency room prior to dictation at 1:45 PM on 7/30/2020. Narrative:  EXAM: CT head       INDICATION: Fall, head injury. COMPARISON: None. TECHNIQUE: Axial CT imaging of the head was performed without intravenous   contrast. Standard multiplanar coronal and sagittal reformatted images were   obtained and are included in interpretation. One or more dose reduction techniques were used on this CT: automated exposure   control, adjustment of the mAs and/or kVp according to patient size, and   iterative reconstruction techniques. The specific techniques used on this CT   exam have been documented in the patient's electronic medical record. Digital   Imaging and Communications in Medicine (DICOM) format image data are available   to nonaffiliated external healthcare facilities or entities on a secure, media   free, reciprocally searchable basis with patient authorization for at least a   12-month period after this study. _______________       FINDINGS:       BRAIN AND POSTERIOR FOSSA: Subcortical and periventricular white matter   low-attenuation is noted with normal-appearing ventricular size and   configuration. Patent basilar cisterns. No acute intra-axial hemorrhage. No   evidence of mass effect or midline shift. Gray-white matter differentiation is   within normal limits. There are physiologic bilateral basal ganglia   calcifications. EXTRA-AXIAL SPACES AND MENINGES: There is an acute extra-axial fluid collection   which is present over the left cerebral convexity with maximal measured   thickness of approximately 2 to 3 mm. No significant mass effect related to this   finding is noted. In addition to the findings which are present along the   lateral aspect of the cerebral convexity, additional lobular hyperdensity   present along the mesial left tentorial leaflet or additional extra-axial blood   products are noted with a measured thickness between 2 to 3 mm in magnitude. No   additional site of abnormal or extra-axial hemorrhage noted. CALVARIUM: Intact. SINUSES: Clear. OTHER: Left-sided phthisis bulbi noted. _______________           07/30/20 1334  CT SPINE LUMB WO CONT Final result    Impression:  IMPRESSION:       No acute fracture or subluxation of the lumbar spine. Multilevel lumbar   spondylosis including moderate to high-grade foraminal encroachment and may   represent source of impingement, suboptimally assessed by CT. Narrative:  EXAM: CT SPINE LUMB WO CONT       CLINICAL INDICATION/HISTORY: low back pain s/p fall       COMPARISON: None. TECHNIQUE: CT of the lumbar spine without intravenous contrast administration. Coronal and sagittal reformats were generated and reviewed. One or more dose   reduction techniques were used on this CT: automated exposure control,   adjustment of the mAs and/or kVp according to patient size, and iterative   reconstruction techniques. The specific techniques used on this CT exam have   been documented in the patient's electronic medical record.   Digital Imaging and   Communications in Medicine (DICOM) format image data are available to   nonaffiliated external healthcare facilities or entities on a secure, media   free, reciprocally searchable basis with patient authorization for at least a 12-month period after this study. _______________           FINDINGS:       VERTEBRAE AND DISCS: Mild dextroconvex scoliosis. Vertebral body heights are   preserved. The posterior elements are intact. Moderate to severe multilevel   degenerative disc disease is noted throughout the lower thoracic and lumbar   spine with disc bulges and disc osteophyte complexes throughout. Small endplate   spurs are noted at multiple levels. No aggressive osseous lesions. PARAVERTEBRAL SOFT TISSUES: Dense atherosclerotic calcifications throughout the   aorta. Correlation of axial and sagittal images reveals the following:       T12-L1: Small broad-based disc bulge with minimal effacement of thecal sac. Mild   bilateral foraminal stenosis. Mild canal stenosis. L1-L2: Small diffuse disc bulge. Mild facet arthropathy. Findings result in mild   canal and foraminal encroachment. L2-L3: Small diffuse disc bulge. Mild facet arthropathy. Findings result in mild   canal and foraminal encroachment. L3-L4: Moderate diffuse disc bulge/disc osteophyte complex. Mild to moderate   facet arthropathy. Findings result in at least moderate bilateral foraminal   encroachment, worse on the left which may be severe. Mild canal stenosis. L4-L5: Moderate diffuse disc bulge. Moderate hypertrophic facet arthropathy. Findings result in at least moderate bilateral foraminal encroachment, worse on   the left. Causing impingement on the bilateral foraminal encroachment. Mild   canal stenosis. L5-S1: Severe degenerative disc disease with disc osteophyte complex producing   effacement of the ventral thecal sac along with moderate to marked hypertrophic   facet arthrosis. Moderate to high-grade foraminal encroachment on the right with   flattening of the right L5 nerve root and to a slightly lesser degree on the   left. The visualized portions of the sacroiliac joints are unremarkable.   Incidentally imaged retroperitoneal structures are unremarkable as well. OTHER: None.       _______________               CXR Results  (Last 48 hours)    None            Medical Decision Making   I am the first provider for this patient. I reviewed the vital signs, available nursing notes, past medical history, past surgical history, family history and social history. Vital Signs-Reviewed the patient's vital signs. Pulse Oximetry Analysis -100 % on room air    Records Reviewed: Nursing Notes and Old Medical Records    Provider Notes:   81 y.o. male presenting with pain to the back of his head, low back pain and left wrist pain after losing balance and falling down 10 steps. On exam patient is awake and alert. Not on anticoagulation. He has a quarter sized hematoma along the left parieto-occipital region. He has swelling and ecchymosis to his left wrist.  No obvious deformity to his lumbar region. Obtain CT scan of his head and lumbar spine. Obtain x-rays of his left wrist.    Procedures:  Procedures    ED Course:   12:07 PM   Initial assessment performed. The patients presenting problems have been discussed, and they are in agreement with the care plan formulated and outlined with them. I have encouraged them to ask questions as they arise throughout their visit. 2:20 PM  Patient with a tract Sebastian fracture. Placed in an ulnar gutter splint. CT scan concerning for a 2 to 3 mm subdural hematoma. As patient is hypertensive, patient started on a Cardene drip. Will give a dose of prophylactic Keppra. Diagnosis and Disposition   2:12 PM  I have spent 120 minutes of critical care time involved in lab review, consultations with specialist, family decision-making, and documentation. During this entire length of time I was immediately available to the patient. Critical Care:   The reason for providing this level of medical care for this critically ill patient was due a critical illness that 68 year old male with DM2, HTN Hypothyroidism, Dyslipidemia, CKD, prior toe amputation due to infection admitted on 8/22 with left toe diabetic foot infection.     Diabetic foot infection   Uncontrolled DM (A1C 8.2)   Toe OM     - s/p left first toe amputation on 8/25       - Clean margin surgical cultures ngtd   - On piperacillin-tazobactam since 8/22  - Received IV vanco 8/22-8/25   - Started on cefazolin this AM.        - Discontinue cefazolin; no added benefit from dual b-lactam coverage   - 8/24 MRI with acute OM of 1st phalanges/septic arthritis and surrounding cellulitis   - 8/23 bedside wound culture with Enterobacter cloacae and Staph lugdunensis   - Vascular consult noted - no intervention needed prior to amputation   - Admission BCx ngtd   - 8/22 QTc 438   - Mild leukocytosis persists  - Renal function stable   - Optimize BS control - BS remain persistently elevated   - Anticipate discharge on oral antibiotics to complete 2 weeks from surgery provided surgical cultures remain negative.     D/w Dr. Bruce  impaired one or more vital organ systems such that there was a high probability of imminent or life threatening deterioration in the patients condition. This care involved high complexity decision making to assess, manipulate, and support vital system functions, to treat this degreee vital organ system failure and to prevent further life threatening deterioration of the patients condition. CONSULT NOTE:   2:12 PM  Salomón Henry DO spoke with Dr. Melyssa Emerson   Specialty: Emergency medicine  Discussed pt's hx, disposition, and available diagnostic and imaging results. Reviewed care plans. Consulting physician agrees with plans as outlined. Excepts transfer to [facility] emergency department  Written by Sloan Gamez DO  TRANSFER PROGRESS NOTE:    2:12 PM  Discussed impending transfer with Patient and/or family. Pt and/or family instructed that Pt would be transferred to [facility] emergency department discussed reasoning for transfer and future treatment plan. Family and Pt understands and agrees with care plan. Written by Sloan Gamez DO    Labs Reviewed   CBC WITH AUTOMATED DIFF   PROTHROMBIN TIME + INR   PTT   METABOLIC PANEL, COMPREHENSIVE       No results found for this or any previous visit (from the past 12 hour(s)). CLINICAL IMPRESSION    1. Subdural hematoma (HCC)    2. Closed nondisplaced fracture of triquetrum of left wrist, initial encounter    3. On aspirin at home    4. Fall, initial encounter              _______________________     Please note that this dictation was completed with [employer], the computer voice recognition software. Quite often unanticipated grammatical, syntax, homophones, and other interpretive errors are inadvertently transcribed by the computer software. Please disregard these errors. Please excuse any errors that have escaped final proofreading.

## 2023-08-29 NOTE — ED PROVIDER NOTES
[address] [facility],[address]  THE [facility] EMERGENCY DEPT    Date: 5/5/2021  Patient Name: Dayron Wang    History of Presenting Illness     Chief Complaint   Patient presents with    Fatigue    Abdominal Pain    Fever     81 y.o. male with a past medical history of GERD, Hypertension, and CHF presents the ED via wife for complaints of a fever and abdominal pain for the past 4 days. Wife states T-max is 101. He did not take any Tylenol today. Patient describes pain is right lower quadrant, intermittent in nature, has had a prior appendectomy. Also reporting body aches, sore throat, dry cough, diarrhea, and feeling fatigued. Denies any chest pain, SOB, nausea, vomiting, blood per rectum, or other symptoms. Prior hx of colectomy, appendectomy, and cholecystectomy. Patient denies any other associated signs or symptoms. Patient denies any other complaints. Nursing notes regarding the HPI and triage nursing notes were reviewed. Prior medical records were reviewed. Current Facility-Administered Medications   Medication Dose Route Frequency Provider Last Rate Last Admin    sodium chloride (NS) flush 5-10 mL  5-10 mL IntraVENous PRN NUNO Aguilar        metroNIDAZOLE (FLAGYL) IVPB premix 500 mg  500 mg IntraVENous Q8H Karen Garcia  mL/hr at 05/05/21 1950 500 mg at 05/05/21 1950    levoFLOXacin (LEVAQUIN) 750 mg in D5W IVPB  750 mg IntraVENous Q24H NUNO Aguilar         Current Outpatient Medications   Medication Sig Dispense Refill    peg 400-propylene glycol (SYSTANE, PROPYLENE GLYCOL,) 0.4-0.3 % drop as needed.  vit A/C/E ac/ZnOx/cupric oxide (EYE VITAMIN AND MINERALS PO) Take  by mouth two (2) times a day.  aspirin delayed-release 81 mg tablet Take 81 mg by mouth daily.  atenolol (TENORMIN) 100 mg tablet Take 100 mg by mouth daily.  benzonatate (TESSALON) 100 mg capsule Take 100 mg by mouth three (3) times daily as needed for Cough.       doxazosin (CARDURA) Per MD: patient family is not interested in SNF placement.      8 mg tablet Take 8 mg by mouth daily.  hydroCHLOROthiazide (HYDRODIURIL) 25 mg tablet Take 25 mg by mouth daily.  simvastatin (ZOCOR) 40 mg tablet Take  by mouth nightly. Past History     Past Medical History:  Past Medical History:   Diagnosis Date    Arthritis     GERD (gastroesophageal reflux disease)     Hypertension        Past Surgical History:  Past Surgical History:   Procedure Laterality Date    HX CHOLECYSTECTOMY      HX HEENT      blind left eye     HX UROLOGICAL      kiney stone    HX VASECTOMY      SD ABDOMEN SURGERY PROC UNLISTED      appendectomy       Family History:  Family History   Problem Relation Age of Onset    Asthma Father        Social History:  Social History     Tobacco Use    Smoking status: Former Smoker    Smokeless tobacco: Never Used   Substance Use Topics    Alcohol use: Not Currently    Drug use: Not on file       Allergies: Allergies   Allergen Reactions    Pcn [Penicillins] Rash       Patient's primary care provider (as noted in EPIC):  Ailyn Lopez MD    Review of Systems   Constitutional: + malaise, fever, chills. ENMT: + sore throat. Cardiac:  Denies chest pain or palpitations. Respiratory: + dry cough. Denies wheezing, difficulty breathing, shortness of breath. GI/ABD: + diarrhea, RLQ pain. Denies nausea, vomiting, constipation. :  Denies injury, pain, dysuria or urgency. Back:  Denies injury or pain. Extremity/MS:  Denies injury or pain. Neuro:  Denies headache, LOC, dizziness, neurologic symptoms/deficits/paresthesias. Skin: Denies injury, rash, itching or skin changes. All other systems negative as reviewed.      Visit Vitals  /77 (BP 1 Location: Left upper arm, BP Patient Position: Sitting)   Pulse 84   Temp 99.2 °F (37.3 °C)   Resp 18   Ht 5' 2\" (1.575 m)   Wt 68 kg (150 lb)   SpO2 97%   BMI 27.44 kg/m²       Patient Vitals for the past 12 hrs:   Temp Pulse Resp BP SpO2   05/05/21 1908 99.2 °F (37.3 °C)       05/05/21 1602 (!) 101.3 °F (38.5 °C) 84 18 133/77 97 %       PHYSICAL EXAM:    CONSTITUTIONAL:  Alert, in no apparent distress;  well developed;  well nourished. HEAD:  Normocephalic, atraumatic. EYES:  EOMI. Non-icteric sclera. Normal conjunctiva. ENTM:  Mouth: mucous membranes moist. Uvula midline, airway patent. Mild erythema, without edema or exudate. NECK:  Supple  RESPIRATORY:  Chest clear, equal breath sounds, good air movement. Without wheezes, rhonchi or rales. CARDIOVASCULAR:  Regular rate and rhythm. No murmurs, rubs, or gallops. GI:  Normal bowel sounds, abdomen soft with TTP to RLQ. No rebound or guarding. BACK:  Non-tender. NEURO:  Moves all four extremities, and grossly normal motor exam.  SKIN:  No rashes;  Normal for age. PSYCH:  Alert and normal affect. DIFFERENTIAL DIAGNOSES/ MEDICAL DECISION MAKING:  Covid, gastritis, gerd, peptic ulcer disease, pancreatitis, gastroenteritis, hepatitis, constipation related pain, diverticulitis, urinary tract infection, obstruction, abdominal wall pain, atypical cardiac (ami or anginal pain), referred pain from pulmonary process (pneumonia, empyema), or combination of the above versus many other processes. ED COURSE AND MEDICAL DECISION MAKING:    Pt declined any pain meds. Given tylenol with improvement of vitals. WBC count 16.9, lactate 0.74. Patient was not given IV fluids due to his history of heart failure.     Recent Results (from the past 12 hour(s))   URINALYSIS W/ RFLX MICROSCOPIC    Collection Time: 05/05/21  4:55 PM   Result Value Ref Range    Color DARK YELLOW      Appearance CLEAR      Specific gravity >1.030 (H) 1.005 - 1.030    pH (UA) 5.5 5.0 - 8.0      Protein 30 (A) NEG mg/dL    Glucose 100 (A) NEG mg/dL    Ketone TRACE (A) NEG mg/dL    Bilirubin SMALL (A) NEG      Blood Negative NEG      Urobilinogen 1.0 0.2 - 1.0 EU/dL    Nitrites Negative NEG      Leukocyte Esterase Negative NEG     SARS-COV-2 Collection Time: 05/05/21  4:55 PM   Result Value Ref Range    SARS-CoV-2 Please find results under separate order     URINE MICROSCOPIC ONLY    Collection Time: 05/05/21  4:55 PM   Result Value Ref Range    WBC 0 to 3 0 - 5 /hpf    RBC 0 to 3 0 - 5 /hpf    Epithelial cells FEW 0 - 5 /lpf    Bacteria 1+ (A) NEG /hpf    Mucus 3+ (A) NEG /lpf    Hyaline cast 0 to 3 0 - 2 /lpf   CBC WITH AUTOMATED DIFF    Collection Time: 05/05/21  4:56 PM   Result Value Ref Range    WBC 16.9 (H) 4.6 - 13.2 K/uL    RBC 4.37 4.35 - 5.65 M/uL    HGB 13.5 13.0 - 16.0 g/dL    HCT 39.4 36.0 - 48.0 %    MCV 90.2 74.0 - 97.0 FL    MCH 30.9 24.0 - 34.0 PG    MCHC 34.3 31.0 - 37.0 g/dL    RDW 12.9 11.6 - 14.5 %    PLATELET 395 170 - 887 K/uL    MPV 11.4 9.2 - 11.8 FL    NEUTROPHILS 76 (H) 40 - 73 %    LYMPHOCYTES 15 (L) 21 - 52 %    MONOCYTES 9 3 - 10 %    EOSINOPHILS 0 0 - 5 %    BASOPHILS 0 0 - 2 %    ABS. NEUTROPHILS 12.8 (H) 1.8 - 8.0 K/UL    ABS. LYMPHOCYTES 2.5 0.9 - 3.6 K/UL    ABS. MONOCYTES 1.5 (H) 0.05 - 1.2 K/UL    ABS. EOSINOPHILS 0.0 0.0 - 0.4 K/UL    ABS. BASOPHILS 0.0 0.0 - 0.1 K/UL    DF AUTOMATED     METABOLIC PANEL, COMPREHENSIVE    Collection Time: 05/05/21  4:56 PM   Result Value Ref Range    Sodium 140 136 - 145 mmol/L    Potassium 3.9 3.5 - 5.5 mmol/L    Chloride 108 100 - 111 mmol/L    CO2 24 21 - 32 mmol/L    Anion gap 8 3.0 - 18 mmol/L    Glucose 98 74 - 99 mg/dL    BUN 23 (H) 7.0 - 18 MG/DL    Creatinine 1.39 (H) 0.6 - 1.3 MG/DL    BUN/Creatinine ratio 17 12 - 20      GFR est AA 59 (L) >60 ml/min/1.73m2    GFR est non-AA 49 (L) >60 ml/min/1.73m2    Calcium 8.6 8.5 - 10.1 MG/DL    Bilirubin, total 0.7 0.2 - 1.0 MG/DL    ALT (SGPT) 32 16 - 61 U/L    AST (SGOT) 18 10 - 38 U/L    Alk.  phosphatase 40 (L) 45 - 117 U/L    Protein, total 6.5 6.4 - 8.2 g/dL    Albumin 3.7 3.4 - 5.0 g/dL    Globulin 2.8 2.0 - 4.0 g/dL    A-G Ratio 1.3 0.8 - 1.7     LIPASE    Collection Time: 05/05/21  4:56 PM   Result Value Ref Range Lipase 72 (L) 73 - 393 U/L   CARDIAC PANEL,(CK, CKMB & TROPONIN)    Collection Time: 05/05/21  4:56 PM   Result Value Ref Range    CK - MB 1.9 <3.6 ng/ml    CK-MB Index 1.4 0.0 - 4.0 %     39 - 308 U/L    Troponin-I, QT <0.02 0.0 - 0.045 NG/ML   COVID-19 RAPID TEST    Collection Time: 05/05/21  4:56 PM   Result Value Ref Range    Specimen source Nasopharyngeal      COVID-19 rapid test Not detected NOTD     EKG, 12 LEAD, INITIAL    Collection Time: 05/05/21  5:03 PM   Result Value Ref Range    Ventricular Rate 77 BPM    Atrial Rate 77 BPM    P-R Interval 216 ms    QRS Duration 114 ms    Q-T Interval 362 ms    QTC Calculation (Bezet) 409 ms    Calculated P Axis 23 degrees    Calculated R Axis -40 degrees    Calculated T Axis 100 degrees    Diagnosis       Sinus rhythm with sinus arrhythmia with 1st degree AV block  Left axis deviation  Septal infarct , age undetermined  Possible Lateral infarct , age undetermined  Abnormal ECG  When compared with ECG of 09-JAN-2020 08:28,  premature atrial complexes are no longer present  Septal infarct is now present  T wave inversion no longer evident in Inferior leads     POC LACTIC ACID    Collection Time: 05/05/21  7:04 PM   Result Value Ref Range    Lactic Acid (POC) 0.74 0.40 - 2.00 mmol/L      Ct Abd Pelv W Cont    Result Date: 5/5/2021  EXAM: CT of the abdomen and pelvis with intravenous contrast. INDICATION:  \"abd pain, fever. \" COMPARISON:  No relevant prior imaging is available for comparison at the time of dictation. DOSE REDUCTION AND DICOM INFORMATION: One or more dose reduction techniques were used on this CT: automated exposure control, adjustment of the mAs and/or kVp according to patient size, and iterative reconstruction techniques. The specific techniques used on this CT exam have been documented in the patient's electronic medical record.   Digital Imaging and Communications in Medicine (DICOM) format image data are available to nonaffiliated external healthcare facilities or entities on a secure, media free, reciprocally searchable basis with patient authorization for at least a 12-month period after this study. _______________ FINDINGS:      CHEST BASE: Endovascular aortic valve prosthesis. LAD stent. Low grade multichamber cardiac enlargement. LIVER: Unremarkable. GALLBLADDER: Unremarkable. BILE DUCTS: Unremarkable. PANCREAS: Unremarkable. SPLEEN: Unremarkable. GASTROINTESTINAL TRACT AND PERITONEAL CAVITY: Cecal resection with ileocolic anastomosis. Marked circumferential bowel wall thickening involving the right colon through the hepatic flexure with surrounding soft tissue stranding. No significant wall thickening in the adjacent neoterminal ileum. Lower esophagus:  Unremarkable. Hiatal hernia:  No.           Diverticulosis:  Marked. Ascites:  No.           Bowel obstruction:  No.           Pneumoperitoneum:  No.      ADRENALS:           Right:  Unremarkable. Left:  Unremarkable. KIDNEYS, URETERS, BLADDER:           Calculi:  None detected. Right:  Small cyst with minimal peripheral calcification does not require specific follow-up. Otherwise unremarkable. Left:  Large simple cyst that does not require specific follow-up. Otherwise unremarkable. Bladder:  Unremarkable. VASCULATURE: Aortoiliac atherosclerosis without AAA. LYMPH NODES: Unremarkable. REPRODUCTIVE ORGANS: Low-grade prostatomegaly. SUPERFICIAL SOFT TISSUES: Right ventral abdominal wall postsurgical muscle atrophy. BONES:  Low-grade scoliosis. Marked hyperlordosis. Advanced chronic disc and facet degeneration. Small lucent lesion in the T11 vertebral body has minimal internal trabeculation, which is suggestive of a benign hemangioma.  A 15 mm sclerotic lesion in the T10 vertebral body is nonspecific but statistically most likely benign as an isolated abnormality, likely an enostosis. _______________     Long segment colitis involving the postsurgical right colon. Differential diagnosis includes infectious, inflammatory/autoimmune, and ischemic etiologies, less likely neoplastic/lymphomatous. Correlate with history regarding the circumstances surrounding the surgery. Follow-up imaging to assess therapeutic response and exclude underlying malignancy is recommended. Chronic ancillary findings, as described. _______________     Vince Almendarez Chest Port    Result Date: 5/5/2021  EXAM: Portable upright chest radiograph. INDICATION: \"meets SIRS criteria. \" COMPARISON: None. _______________ FINDINGS:    LUNGS:           --Expansion:  Adequate. --Consolidation:  None detected. --Pulmonary edema:  None detected. --Other:  Non-applicable. PLEURAL SPACE:          --Pleural effusion:  None detected. --Pneumothorax:  None detected. _______________     Negative radiographic examination. _______________      Levaquin and flagyl ordered as patient is pen allergic. Consult with general surgeon, Dr. Noni Haywood who is requesting admit to hospitalist.     Consult with Dr. Lis Dudley who will accept on medical floor. Critical Care Time:  The services I provided to this patient were to treat and/or prevent clinically significant deterioration that could result in the failure of one or more body systems and/or organ systems due to colitis/SIRS. Services included the following:  -reviewing nursing notes and old charts  -vital sign assessments  -direct patient care  -medication orders and management  -interpreting and reviewing diagnostic studies/labs  -re-evaluations  -documentation time    Aggregate critical care time was 35 minutes, which includes only time during which I was engaged in work directly related to the patient's care as described above, whether I was at bedside or elsewhere in the Emergency Department.  It did not include time spent performing other reported procedures or the services of residents, students, nurses, or advance practice providers. NUNO Lyn   8:13 PM    Diagnosis:   1. Colitis    2. SIRS (systemic inflammatory response syndrome) (HCC)    3. Abnormal CT of the abdomen      Disposition: Admission    Patient's Medications   Start Taking    No medications on file   Continue Taking    ASPIRIN DELAYED-RELEASE 81 MG TABLET    Take 81 mg by mouth daily. ATENOLOL (TENORMIN) 100 MG TABLET    Take 100 mg by mouth daily. BENZONATATE (TESSALON) 100 MG CAPSULE    Take 100 mg by mouth three (3) times daily as needed for Cough. DOXAZOSIN (CARDURA) 8 MG TABLET    Take 8 mg by mouth daily. HYDROCHLOROTHIAZIDE (HYDRODIURIL) 25 MG TABLET    Take 25 mg by mouth daily. -PROPYLENE GLYCOL (SYSTANE, PROPYLENE GLYCOL,) 0.4-0.3 % DROP    as needed. SIMVASTATIN (ZOCOR) 40 MG TABLET    Take  by mouth nightly. VIT A/C/E AC/ZNOX/CUPRIC OXIDE (EYE VITAMIN AND MINERALS PO)    Take  by mouth two (2) times a day.    These Medications have changed    No medications on file   Stop Taking    No medications on file     NUNO Lyn

## 2024-12-02 ENCOUNTER — HOSPITAL ENCOUNTER (EMERGENCY)
Facility: HOSPITAL | Age: 89
Discharge: HOME | End: 2024-12-02
Payer: MEDICARE

## 2024-12-02 ENCOUNTER — APPOINTMENT (OUTPATIENT)
Facility: HOSPITAL | Age: 89
End: 2024-12-02
Payer: MEDICARE

## 2024-12-02 VITALS
TEMPERATURE: 97.9 F | RESPIRATION RATE: 19 BRPM | OXYGEN SATURATION: 100 % | HEART RATE: 65 BPM | BODY MASS INDEX: 26.43 KG/M2 | SYSTOLIC BLOOD PRESSURE: 158 MMHG | WEIGHT: 140 LBS | DIASTOLIC BLOOD PRESSURE: 74 MMHG | HEIGHT: 61 IN

## 2024-12-02 DIAGNOSIS — J06.9 ACUTE UPPER RESPIRATORY INFECTION: Primary | ICD-10-CM

## 2024-12-02 LAB
FLUAV RNA SPEC QL NAA+PROBE: NOT DETECTED
FLUBV RNA SPEC QL NAA+PROBE: NOT DETECTED
SARS-COV-2 RNA RESP QL NAA+PROBE: NOT DETECTED
SOURCE: NORMAL

## 2024-12-02 PROCEDURE — 99283 EMERGENCY DEPT VISIT LOW MDM: CPT

## 2024-12-02 PROCEDURE — 87636 SARSCOV2 & INF A&B AMP PRB: CPT

## 2024-12-02 ASSESSMENT — PAIN SCALES - GENERAL: PAINLEVEL_OUTOF10: 0

## 2024-12-02 ASSESSMENT — PAIN - FUNCTIONAL ASSESSMENT: PAIN_FUNCTIONAL_ASSESSMENT: 0-10

## 2024-12-02 NOTE — ED PROVIDER NOTES
[facility] EMERGENCY DEPT  EMERGENCY DEPARTMENT ENCOUNTER       Pt Name: Ronal Spain  MRN: [id number]  Birthdate 1935  Date of evaluation: 12/2/2024  PCP: Marichuy Arevalo MD  Note Started: 6:35 PM 12/2/24     CHIEF COMPLAINT       Chief Complaint   Patient presents with    Cough    Generalized Body Aches        HISTORY OF PRESENT ILLNESS: 1 or more elements      History From: Patient  HPI Limitations: None  Chronic Conditions: HTN, stroke, GERD, arthritis  Social Determinants affecting Dx or Tx: none      Ronal Spain is a 89 y.o. male who presents to ED c/o cough and fever which began 2 days ago and already improving.  Patient states he feels well today he was just being checked out because his wife is being seen with similar symptoms but her illness is worse.  He states he has not had a fever since yesterday and his cough is minimal.  He denies sore throat, vomiting, difficulty breathing and any other symptoms or complaints.  He has been recently vaccinated against both COVID and flu.     Nursing Notes were all reviewed and agreed with or any disagreements were addressed in the HPI.    PAST HISTORY     Past Medical History:  Past Medical History:   Diagnosis Date    Arthritis     GERD (gastroesophageal reflux disease)     Hypertension     Stroke (HCC)        Past Surgical History:  Past Surgical History:   Procedure Laterality Date    CHOLECYSTECTOMY      HEENT      blind left eye     TX UNLISTED PROCEDURE ABDOMEN PERITONEUM & OMENTUM      appendectomy    UROLOGICAL SURGERY      kiney stone    VASECTOMY      XR MIDLINE EQUAL OR GREATER THAN 5 YEARS  2/26/2020    XR MIDLINE EQUAL OR GREATER THAN 5 YEARS 2/26/2020       Family History:  Family History   Problem Relation Age of Onset    Asthma Father     Heart Disease Father        Social History:  Social History     Socioeconomic History    Marital status:      Spouse name: None    Number of children: None    Years of education: None

## 2024-12-02 NOTE — ED TRIAGE NOTES
Cough started a few days ago, non-productive cough. Pt stated having fever Friday, didn't take anything for it. Lung sounds clear to auscultation

## (undated) DEVICE — PRESSURE MONITORING SET: Brand: TRUWAVE

## (undated) DEVICE — Z DISCONTINUED NO SUB IDED CATHETER ANGIO 5-6FR L110CM GWIRE 0.038IN 145DEG PGTL 5-8

## (undated) DEVICE — GUIDEWIRE VASC L150CM DIA0.025IN TIP L7CM J RAD 3MM PTFE

## (undated) DEVICE — GLIDESHEATH SLENDER STAINLESS STEEL KIT: Brand: GLIDESHEATH SLENDER

## (undated) DEVICE — DRAPE,ANGIO,BRACH,STERILE,38X44: Brand: MEDLINE

## (undated) DEVICE — PROCEDURE KIT FLUID MGMT 10 FR CUST MAINFOLD

## (undated) DEVICE — SENSOR PLSE OXMTR AD CBL L36IN ADH FRM FIT SPO2 DISP

## (undated) DEVICE — PACK PROCEDURE SURG CATH CUST

## (undated) DEVICE — STOPCOCK TRNSDUC 500PSI 3 W ROT M LUER LT BLU OFF HNDL R

## (undated) DEVICE — BAND RADIAL COMPR ARTERY 24CM -- REG BX/10

## (undated) DEVICE — CATHETER ANGIO NTR 0.045 INX5 FRX100 CM THRULUMEN EXPO

## (undated) DEVICE — Device

## (undated) DEVICE — TUBING PRSS MON L24IN PVC RIG NONEXPANDING M TO FEM CONN

## (undated) DEVICE — GUIDEWIRE VASC L260CM DIA0.035IN RAD 3MM J TIP L7CM PTFE

## (undated) DEVICE — GUIDEWIRE VASC L260CM DIA0.035IN STR TIP L7CM PTFE FIX COR

## (undated) DEVICE — CATH DIAG FR4 EXPO 5FRX100CM -- EXPO

## (undated) DEVICE — ANGIOGRAPHIC CATHETER: Brand: EXPO™

## (undated) DEVICE — TORCON NB, ADVANTAGE CATHETER: Brand: TORCON NB

## (undated) DEVICE — COPILOT BLEEDBACK CONTROL VALVE: Brand: COPILOT

## (undated) DEVICE — SWAN-GANZ POLYMER BLEND TRUE SIZE C-TIP CONTROLCATH TD CATHETER: Brand: SWAN-GANZ CONTROLCATH TRUE SIZE

## (undated) DEVICE — CATHETER DIAG 6FR L100CM COR NYL JUDKINS R 5 RADPQ W/O SIDE

## (undated) DEVICE — SHIELD RAD 14X16 IN W/ SCOOP ABSORBER

## (undated) DEVICE — CATHETER DIAG 6FR L100CM COR NYL JUDKINS L 4 RADPQ W/O SIDE